# Patient Record
Sex: FEMALE | Race: WHITE | NOT HISPANIC OR LATINO | Employment: FULL TIME | URBAN - METROPOLITAN AREA
[De-identification: names, ages, dates, MRNs, and addresses within clinical notes are randomized per-mention and may not be internally consistent; named-entity substitution may affect disease eponyms.]

---

## 2017-01-16 ENCOUNTER — OFFICE VISIT (OUTPATIENT)
Dept: MEDICAL GROUP | Facility: MEDICAL CENTER | Age: 62
End: 2017-01-16
Attending: FAMILY MEDICINE
Payer: MEDICAID

## 2017-01-16 VITALS
OXYGEN SATURATION: 96 % | TEMPERATURE: 98.2 F | RESPIRATION RATE: 16 BRPM | DIASTOLIC BLOOD PRESSURE: 78 MMHG | SYSTOLIC BLOOD PRESSURE: 126 MMHG | WEIGHT: 140 LBS | HEIGHT: 64 IN | HEART RATE: 76 BPM | BODY MASS INDEX: 23.9 KG/M2

## 2017-01-16 DIAGNOSIS — E78.5 DYSLIPIDEMIA: ICD-10-CM

## 2017-01-16 DIAGNOSIS — K62.5 RECTAL BLEEDING: ICD-10-CM

## 2017-01-16 DIAGNOSIS — M54.12 CERVICAL RADICULOPATHY: ICD-10-CM

## 2017-01-16 DIAGNOSIS — F33.41 RECURRENT MAJOR DEPRESSIVE DISORDER, IN PARTIAL REMISSION (HCC): ICD-10-CM

## 2017-01-16 PROBLEM — F32.4 MAJOR DEPRESSIVE DISORDER WITH SINGLE EPISODE, IN PARTIAL REMISSION (HCC): Status: ACTIVE | Noted: 2017-01-16

## 2017-01-16 PROBLEM — F32.4 MAJOR DEPRESSIVE DISORDER WITH SINGLE EPISODE, IN PARTIAL REMISSION (HCC): Status: RESOLVED | Noted: 2017-01-16 | Resolved: 2017-01-16

## 2017-01-16 PROCEDURE — 99214 OFFICE O/P EST MOD 30 MIN: CPT | Performed by: FAMILY MEDICINE

## 2017-01-16 NOTE — MR AVS SNAPSHOT
"        Honey Conde   2017 10:50 AM   Office Visit   MRN: [id number]    Department:  [facility]   Dept Phone:  [phone]    Description:  Female : 1955   Provider:  Dave Bobby M.D.           Allergies as of 2017     Allergen Noted Reactions    Ceclor [Cefaclor] 2012   Hives    Erythromycin 2012   Swelling    Pcn [Penicillins] 2012         You were diagnosed with     Rectal bleeding   [100402]       Cervical radiculopathy   [573850]       Recurrent major depressive disorder, in partial remission (CMS-[facility])   [6972575]       Dyslipidemia   [059270]         Vital Signs     Blood Pressure Pulse Temperature Respirations Height Weight    126/78 mmHg 76 36.8 °C (98.2 °F) 16 1.626 m (5' 4.02\") 63.504 kg (140 lb)    Body Mass Index Oxygen Saturation Smoking Status             24.02 kg/m2 96% Current Every Day Smoker         Basic Information     Date Of Birth Sex Race Ethnicity Preferred Language    1955 Female White Non- English      Your appointments     2017 11:10 AM   Established Patient with Dave Bobby M.D.   [facility] ([facility])    [address] [place] [address] [id number] [phone]           You will be receiving a confirmation call a few days before your appointment from our automated call confirmation system.              Problem List              ICD-10-CM Priority Class Noted - Resolved    Depression F32.9   7/10/2015 - Present    Anxiety F41.9   7/10/2015 - Present    Cervical radiculopathy M54.12   7/10/2015 - Present    Recurrent major depressive disorder, in partial remission (CMS-HCC) F33.41   10/11/2016 - Present    Dyslipidemia E78.5   2017 - Present    Rectal bleeding K62.5   2017 - Present      Health Maintenance        Date Due Completion Dates    IMM DTaP/Tdap/Td Vaccine (1 - Tdap) 9/10/1976 ---    IMM INFLUENZA (1) 2016 ---    MAMMOGRAM 2017, 4/15/2016, 4/15/2016, 4/15/2016, " 4/15/2016, 12/10/2014 (N/S), 2/10/2009, 2/10/2009    Override on 12/10/2014: (N/S)    PAP SMEAR 2/11/2018 2/11/2015 (N/S)    Override on 2/11/2015: (N/S)    COLONOSCOPY 9/10/2025 9/10/2015 (Declined)    Override on 9/10/2015: Patient Declined (agrees to FIT)            Current Immunizations     No immunizations on file.      Below and/or attached are the medications your provider expects you to take. Review all of your home medications and newly ordered medications with your provider and/or pharmacist. Follow medication instructions as directed by your provider and/or pharmacist. Please keep your medication list with you and share with your provider. Update the information when medications are discontinued, doses are changed, or new medications (including over-the-counter products) are added; and carry medication information at all times in the event of emergency situations     Allergies:  CECLOR - Hives     ERYTHROMYCIN - Swelling     PCN - (reactions not documented)               Medications  Valid as of: January 16, 2017 - 11:28 AM    Generic Name Brand Name Tablet Size Instructions for use    Baclofen (Tab) LIORESAL 20 MG Take 20 mg by mouth 3 times a day.        Baclofen (Tab) LIORESAL 20 MG TAKE ONE TABLET BY MOUTH TWICE DAILY        Hydrocodone-Acetaminophen (Tab) NORCO 5-325 MG Take 1-2 Tabs by mouth every four hours as needed.        hydrocodone/APAP 5/325 mg (NORCO) 5-325 Tab (Tab) NORCO 5-325 Take 1 Tab by mouth every 8 hours as needed.        hydrocodone/APAP 5/325 mg (NORCO) 5-325 Tab (Tab) NORCO 5-325 Take 1 Tab by mouth every 8 hours as needed.        hydrocodone/APAP 5/325 mg (NORCO) 5-325 Tab (Tab) NORCO 5-325 Take 1 Tab by mouth every 8 hours as needed.        PARoxetine HCl (Tab) PAXIL 20 MG Take 20 mg by mouth every day.        PARoxetine HCl (Tab) PAXIL 20 MG Take 1 Tab by mouth every day.        PARoxetine HCl (Tab) PAXIL 20 MG TAKE ONE TABLET BY MOUTH ONCE DAILY        PARoxetine HCl (Tab)  PAXIL 20 MG TAKE ONE TABLET BY MOUTH ONCE DAILY        .                 Medicines prescribed today were sent to:     [facility] - [place], [place] - [address]    [address] [place] [address]    Phone: [phone] Fax: [phone]    Open 24 Hours?: No    [facility] - [place], [place] - [address]    [address] [place] [address]    Phone: [phone] Fax: [phone]    Open 24 Hours?: No      Medication refill instructions:       If your prescription bottle indicates you have medication refills left, it is not necessary to call your provider’s office. Please contact your pharmacy and they will refill your medication.    If your prescription bottle indicates you do not have any refills left, you may request refills at any time through one of the following ways: The online [employer] system (except Urgent Care), by calling your provider’s office, or by asking your pharmacy to contact your provider’s office with a refill request. Medication refills are processed only during regular business hours and may not be available until the next business day. Your provider may request additional information or to have a follow-up visit with you prior to refilling your medication.   *Please Note: Medication refills are assigned a new Rx number when refilled electronically. Your pharmacy may indicate that no refills were authorized even though a new prescription for the same medication is available at the pharmacy. Please request the medicine by name with the pharmacy before contacting your provider for a refill.        Your To Do List     Future Labs/Procedures Complete By Expires    COMP METABOLIC PANEL  As directed 1/17/2018    LIPID PROFILE  As directed 1/17/2018    [facility] PAIN MANAGEMENT SCREEN  As directed 1/16/2018    Comments:    Current Meds (name, sig, last dose):   Current outpatient prescriptions:   •  baclofen, TAKE ONE TABLET BY MOUTH TWICE DAILY  •  paroxetine, TAKE ONE  TABLET BY MOUTH ONCE DAILY  •  hydrocodone/APAP 5/325 mg, 1 Tab, Oral, Q8HRS PRN  •  hydrocodone/APAP 5/325 mg, 1 Tab, Oral, Q8HRS PRN  •  paroxetine, TAKE ONE TABLET BY MOUTH ONCE DAILY  •  hydrocodone/APAP 5/325 mg, 1 Tab, Oral, Q8HRS PRN  •  paroxetine, 20 mg, Oral, DAILY  •  hydrocodone-acetaminophen, 1-2 Tab, Oral, Q4HRS PRN  •  baclofen, 20 mg, Oral, TID  •  paroxetine, 20 mg, Oral, DAILY            Referral     A referral request has been sent to our patient care coordination department. Please allow 3-5 business days for us to process this request and contact you either by phone or mail. If you do not hear from us by the 5th business day, please call us at [phone].        Other Notes About Your Plan     Fall risk done 07/10/2015           eflow Access Code: [email or web address]  Expires: 2/15/2017 11:27 AM    eflow  A secure, online tool to manage your health information     [employer]’s eflow® is a secure, online tool that connects you to your personalized health information from the privacy of your home -- day or night - making it very easy for you to manage your healthcare. Once the activation process is completed, you can even access your medical information using the eflow mike, which is available for free in the Apple Mike store or Google Play store.     eflow provides the following levels of access (as shown below):   My Chart Features   [facility] Doctor [facility]  Specialists [facility]  Urgent  Care Non-[facility]  Primary Care  Doctor   Email your healthcare team securely and privately 24/7 X X X    Manage appointments: schedule your next appointment; view details of past/upcoming appointments X      Request prescription refills. X      View recent personal medical records, including lab and immunizations X X X X   View health record, including health history, allergies, medications X X X X   Read reports about your outpatient visits, procedures, consult and ER notes X X X X   See  your discharge summary, which is a recap of your hospital and/or ER visit that includes your diagnosis, lab results, and care plan. X X       How to register for [employer]:  1. Go to  [email or web address].  2. Click on the Sign Up Now box, which takes you to the New Member Sign Up page. You will need to provide the following information:  a. Enter your CoolClouds Access Code exactly as it appears at the top of this page. (You will not need to use this code after you’ve completed the sign-up process. If you do not sign up before the expiration date, you must request a new code.)   b. Enter your date of birth.   c. Enter your home email address.   d. Click Submit, and follow the next screen’s instructions.  3. Create a CoolClouds ID. This will be your CoolClouds login ID and cannot be changed, so think of one that is secure and easy to remember.  4. Create a CoolClouds password. You can change your password at any time.  5. Enter your Password Reset Question and Answer. This can be used at a later time if you forget your password.   6. Enter your e-mail address. This allows you to receive e-mail notifications when new information is available in CoolClouds.  7. Click Sign Up. You can now view your health information.    For assistance activating your [employer]Cl[employer] account, call [phone]

## 2017-01-16 NOTE — PROGRESS NOTES
No chief complaint on file.      HISTORY OF PRESENT ILLNESS: Patient is a 61 y.o. female established patient who presents today to be evaluated for recent rectal bleeding, cervical radiculopathy, recurrent depression        Rectal bleeding  Patient reports a difficult to describe rectal fullness with slight discomfort intermittently over the past 6-8 weeks. She reports about 10% of the time she will see bright red blood either on the tissue paper or just on the outside of the stool. She has no family history of colon cancer. She reports she had a unremarkable colonoscopy done as part of evaluation of abdominal pain approximately 1996. She had been reporting some flat ribbon-type stools although reports a normal caliber stool in the last 48 hours. When not bloody stool has been predominantly soft and brown. She has had no unexplained weight loss. Denies any dysuria, and no urinary or fecal incontinence.    Cervical radiculopathy  Patient continues to experience daily mid to lower neck pain. She reports occasional numbness involving either hand weekly when she is in in bed or has just been in bed. She is getting fairly good relief utilizing on average 2.5 tablets of Norco 5/325 per day. She ran out about 4 days ago due to having to postpone her follow-up appointment due to weather issues. Not noticing any upper extremity weakness other than tenderness limiting motion in her right shoulder for the past month associated with increased    Recurrent depression  Patient's compliance taking Paxil 20 mg daily. Denies suicidal ideation, confusion, near syncope    Patient Active Problem List    Diagnosis Date Noted   • Dyslipidemia 01/16/2017   • Rectal bleeding 01/16/2017   • Recurrent major depressive disorder, in partial remission (CMS-[facility]) 10/11/2016   • Depression 07/10/2015   • Anxiety 07/10/2015   • Cervical radiculopathy 07/10/2015       Allergies:Ceclor; Erythromycin; and Pcn    Current Outpatient Prescriptions    Medication Sig Dispense Refill   • hydrocodone/APAP 5/325 mg (NORCO) 5-325 Tab Take 1 Tab by mouth every 8 hours as needed. 75 Tab 0   • baclofen (LIORESAL) 20 MG tablet TAKE ONE TABLET BY MOUTH TWICE DAILY 60 Tab 6   • paroxetine (PAXIL) 20 MG Tab TAKE ONE TABLET BY MOUTH ONCE DAILY 30 Tab 6   • hydrocodone/APAP 5/325 mg (NORCO) 5-325 Tab Take 1 Tab by mouth every 8 hours as needed. 90 Tab 0   • paroxetine (PAXIL) 20 MG Tab TAKE ONE TABLET BY MOUTH ONCE DAILY 30 Tab 3   • hydrocodone/APAP 5/325 mg (NORCO) 5-325 Tab Take 1 Tab by mouth every 8 hours as needed. 90 Tab 0   • paroxetine (PAXIL) 20 MG TABS Take 20 mg by mouth every day.     • hydrocodone-acetaminophen (NORCO) 5-325 MG TABS per tablet Take 1-2 Tabs by mouth every four hours as needed.     • baclofen (LIORESAL) 20 MG tablet Take 20 mg by mouth 3 times a day.     • paroxetine (PAXIL) 20 MG TABS Take 1 Tab by mouth every day. 30 Tab 6     No current facility-administered medications for this visit.       Social History   Substance Use Topics   • Smoking status: Current Every Day Smoker -- 0.50 packs/day   • Smokeless tobacco: Never Used   • Alcohol Use: 0.0 oz/week     0 Standard drinks or equivalent per week      Comment: intermittently       Family History   Problem Relation Age of Onset   • Cancer Mother 76     breast   • Heart Disease Maternal Uncle    • Diabetes Maternal Grandmother    • Stroke Neg Hx        ROS:  Review of Systems   Constitutional: Negative for fever, chills, weight loss and malaise/fatigue.   Eyes: Negative for blurred vision.   Respiratory: Negative for cough, sputum production, shortness of breath and wheezing.    Cardiovascular: Negative for chest pain, palpitations, orthopnea and leg swelling.   Gastrointestinal: Negative for heartburn, nausea, vomiting and abdominal pain.   Endo/Heme/Allergies: Does not bruise/bleed easily.               Exam:  Blood pressure 126/78, pulse 76, temperature 36.8 °C (98.2 °F), resp. rate 16,  "height 1.626 m (5' 4.02\"), weight 63.504 kg (140 lb), SpO2 96 %.  General:  Well nourished, well developed female in NAD  Head is grossly normal.  Neck: Supple without JVD or bruit. Thyroid is not enlarged. Trachea is midline.  Pulmonary: Clear to ausculation .  Normal effort. No rales, ronchi, or wheezing.  Cardiovascular: Regular rate and rhythm without murmur.  Abdomen-Abdomen is soft, normal bowel sounds, no masses, guarding, ororganomegaly, or tenderness.  Lower extremities- neg for pretibial edema, redness, tenderness.  Rectal-3 medium-sized soft hemorrhoidal tags are noted at the 3:00 position. Digital rectal exam reveals no rectal masses or palpable internal hemorrhoids. No blood visible on exam glove.   Upper extremities-intact light touch, intact strength bilaterally    Please note that this dictation was created using voice recognition software. I have made every reasonable attempt to correct obvious errors, but I expect that there are errors of grammar and possibly content that I did not discover before finalizing the note.    Assessment/Plan:  1. Rectal bleeding  REFERRAL TO GASTROENTEROLOGY   2. Cervical radiculopathy  [facility] PAIN MANAGEMENT SCREEN    hydrocodone/APAP 5/325 mg (NORCO) 5-325 Tab   3. Recurrent major depressive disorder, in partial remission (CMS-HCC)     4. Dyslipidemia  LIPID PROFILE    COMP METABOLIC PANEL      Plan: 1. GI referral for diagnostic colonoscopy  2. Reorder labs for lipids, CMP  3. [facility] UDS today  4. Renew Norco 5/325, #75 today    "

## 2017-01-16 NOTE — ASSESSMENT & PLAN NOTE
Patient reports a difficult to describe rectal fullness with slight discomfort intermittently over the past 6-8 weeks. She reports about 10% of the time she will see bright red blood either on the tissue paper or just on the outside of the stool. She has no family history of colon cancer. She reports she had a unremarkable colonoscopy done as part of evaluation of abdominal pain approximately 1996. She had been reporting some flat ribbon-type stools although reports a normal caliber stool in the last 48 hours. When not bloody stool has been predominantly soft and brown. She has had no unexplained weight loss. Denies any dysuria, and no urinary or fecal incontinence.

## 2017-01-16 NOTE — ASSESSMENT & PLAN NOTE
Patient reports mood is stable with no suicidal ideation or tearfulness. She is compliant taking paroxetine 20 mg daily at bedtime

## 2017-01-16 NOTE — ASSESSMENT & PLAN NOTE
Patient continues to experience daily mid to lower neck pain. She reports occasional numbness involving either hand weekly when she is in in bed or has just been in bed. She is getting fairly good relief utilizing on average 2.5 tablets of Norco 5/325 per day. She ran out about 4 days ago due to having to postpone her follow-up appointment due to weather issues. Not noticing any upper extremity weakness other than tenderness limiting motion in her right shoulder for the past month associated with increased

## 2017-02-13 ENCOUNTER — HOSPITAL ENCOUNTER (OUTPATIENT)
Dept: LAB | Facility: MEDICAL CENTER | Age: 62
End: 2017-02-13
Attending: FAMILY MEDICINE
Payer: MEDICAID

## 2017-02-13 DIAGNOSIS — E78.5 DYSLIPIDEMIA: ICD-10-CM

## 2017-02-13 LAB
ALBUMIN SERPL BCP-MCNC: 4.2 G/DL (ref 3.2–4.9)
ALBUMIN/GLOB SERPL: 1.8 G/DL
ALP SERPL-CCNC: 53 U/L (ref 30–99)
ALT SERPL-CCNC: <5 U/L (ref 2–50)
ANION GAP SERPL CALC-SCNC: 6 MMOL/L (ref 0–11.9)
AST SERPL-CCNC: 14 U/L (ref 12–45)
BILIRUB SERPL-MCNC: 0.4 MG/DL (ref 0.1–1.5)
BUN SERPL-MCNC: 20 MG/DL (ref 8–22)
CALCIUM SERPL-MCNC: 9.8 MG/DL (ref 8.5–10.5)
CHLORIDE SERPL-SCNC: 109 MMOL/L (ref 96–112)
CHOLEST SERPL-MCNC: 211 MG/DL (ref 100–199)
CO2 SERPL-SCNC: 27 MMOL/L (ref 20–33)
CREAT SERPL-MCNC: 0.75 MG/DL (ref 0.5–1.4)
GLOBULIN SER CALC-MCNC: 2.3 G/DL (ref 1.9–3.5)
GLUCOSE SERPL-MCNC: 106 MG/DL (ref 65–99)
HDLC SERPL-MCNC: 55 MG/DL
LDLC SERPL CALC-MCNC: 140 MG/DL
POTASSIUM SERPL-SCNC: 4 MMOL/L (ref 3.6–5.5)
PROT SERPL-MCNC: 6.5 G/DL (ref 6–8.2)
SODIUM SERPL-SCNC: 142 MMOL/L (ref 135–145)
TRIGL SERPL-MCNC: 80 MG/DL (ref 0–149)

## 2017-02-13 PROCEDURE — 80053 COMPREHEN METABOLIC PANEL: CPT

## 2017-02-13 PROCEDURE — 80061 LIPID PANEL: CPT

## 2017-02-13 PROCEDURE — 36415 COLL VENOUS BLD VENIPUNCTURE: CPT

## 2017-02-14 ENCOUNTER — TELEPHONE (OUTPATIENT)
Dept: MEDICAL GROUP | Facility: MEDICAL CENTER | Age: 62
End: 2017-02-14

## 2017-02-16 ENCOUNTER — OFFICE VISIT (OUTPATIENT)
Dept: MEDICAL GROUP | Facility: MEDICAL CENTER | Age: 62
End: 2017-02-16
Attending: FAMILY MEDICINE
Payer: MEDICAID

## 2017-02-16 VITALS
RESPIRATION RATE: 12 BRPM | TEMPERATURE: 97.3 F | DIASTOLIC BLOOD PRESSURE: 62 MMHG | WEIGHT: 140 LBS | BODY MASS INDEX: 23.9 KG/M2 | SYSTOLIC BLOOD PRESSURE: 110 MMHG | HEART RATE: 60 BPM | HEIGHT: 64 IN | OXYGEN SATURATION: 95 %

## 2017-02-16 DIAGNOSIS — E78.5 DYSLIPIDEMIA: ICD-10-CM

## 2017-02-16 DIAGNOSIS — M54.12 CERVICAL RADICULOPATHY: ICD-10-CM

## 2017-02-16 PROCEDURE — 99213 OFFICE O/P EST LOW 20 MIN: CPT | Performed by: FAMILY MEDICINE

## 2017-02-16 PROCEDURE — 99212 OFFICE O/P EST SF 10 MIN: CPT | Performed by: FAMILY MEDICINE

## 2017-02-16 ASSESSMENT — PATIENT HEALTH QUESTIONNAIRE - PHQ9: CLINICAL INTERPRETATION OF PHQ2 SCORE: 0

## 2017-02-16 ASSESSMENT — PAIN SCALES - GENERAL: PAINLEVEL: 8=MODERATE-SEVERE PAIN

## 2017-02-16 NOTE — PROGRESS NOTES
"Subjective:      Honey Conde is a 61 y.o. female who presents with Follow-Up            HPI 1. Cervical radiculopathy-patient reports daily mild to moderate pain in the posterior aspect of her neck with rare brief radiation into either arm-that typically has to do with sleeping with her arms raised in a poor position above her head while in bed. She reports no upper extremity numbness. She gets by comfortably on most days with her current reduction and prescription of Norco 5/325 #75 for the month. UDS collected last month was appropriate   2. Dyslipidemia-recent labs notable for LDL of 140 with total cholesterol of 214, excellent triglycerides and modest elevation of HDL cholesterol. Patient reports current minimal red meat intake modest cheese exposure and she avoids eggs altogether.  ROS negative for current chest pain, chest pressure, focal weakness, difficulty swallowing       Objective:     /62 mmHg  Pulse 60  Temp(Src) 36.3 °C (97.3 °F)  Resp 12  Ht 1.626 m (5' 4.02\")  Wt 63.504 kg (140 lb)  BMI 24.02 kg/m2  SpO2 95%  Breastfeeding? No     Physical Exam  Gen.- alert, cooperative, in no acute distress  Neck- midline trachea, thyroid not enlarged or tender,supple, no cervical adenopathy  Chest-clear to auscultation and percussion with normal breath sounds. No retractions. Chest wall nontender  Cardiac- regular rhythm and rate. No murmur, thrill, or heave  Abdomen- normal bowel sounds, soft without guarding. Liver and spleen not enlarged, no palpable masses or tenderness.          Assessment/Plan:     1. Cervical radiculopathy    - hydrocodone/APAP 5/325 mg (NORCO) 5-325 Tab; Take 1 Tab by mouth every 8 hours as needed.  Dispense: 75 Tab; Refill: 0    2. Dyslipidemia  Plan: 1. Will renew Norco 5/325, #75 per month each month for 3 months  2. Revisit in 3 months with consideration of further reducing Norco quantity  3. Discussed dietary adjustment to reduce LDL cholesterol  4. Colonoscopy is " pending-intake evaluation mid-March

## 2017-02-16 NOTE — MR AVS SNAPSHOT
"        Honey Conde   2017 11:10 AM   Office Visit   MRN: [id number]    [facility]   Dept Phone:  [phone]    Description:  Female : 1955   Provider:  Dave Bobby M.D.           Reason for Visit     Follow-Up           Allergies as of 2017     Allergen Noted Reactions    Ceclor [Cefaclor] 2012   Hives    Erythromycin 2012   Swelling    Pcn [Penicillins] 2012         You were diagnosed with     Cervical radiculopathy   [954208]       Dyslipidemia   [386393]         Vital Signs     Blood Pressure Pulse Temperature Respirations Height Weight    110/62 mmHg 60 36.3 °C (97.3 °F) 12 1.626 m (5' 4.02\") 63.504 kg (140 lb)    Body Mass Index Oxygen Saturation Breastfeeding? Smoking Status          24.02 kg/m2 95% No Current Every Day Smoker        Basic Information     Date Of Birth Sex Race Ethnicity Preferred Language    1955 Female White Non- English      Problem List              ICD-10-CM Priority Class Noted - Resolved    Depression F32.9   7/10/2015 - Present    Anxiety F41.9   7/10/2015 - Present    Cervical radiculopathy M54.12   7/10/2015 - Present    Recurrent major depressive disorder, in partial remission (CMS-HCC) F33.41   10/11/2016 - Present    Dyslipidemia E78.5   2017 - Present    Rectal bleeding K62.5   2017 - Present      Health Maintenance        Date Due Completion Dates    IMM DTaP/Tdap/Td Vaccine (1 - Tdap) 9/10/1976 ---    IMM INFLUENZA (1) 2016 ---    MAMMOGRAM 2017, 4/15/2016, 4/15/2016, 4/15/2016, 4/15/2016, 12/10/2014 (N/S), 2/10/2009    Override on 12/10/2014: (N/S)    PAP SMEAR 2018 (N/S)    Override on 2015: (N/S)    COLONOSCOPY 9/10/2025 9/10/2015 (Declined)    Override on 9/10/2015: Patient Declined (agrees to FIT)            Current Immunizations     No immunizations on file.      Below and/or attached are the medications your provider expects you to take. Review " all of your home medications and newly ordered medications with your provider and/or pharmacist. Follow medication instructions as directed by your provider and/or pharmacist. Please keep your medication list with you and share with your provider. Update the information when medications are discontinued, doses are changed, or new medications (including over-the-counter products) are added; and carry medication information at all times in the event of emergency situations     Allergies:  CECLOR - Hives     ERYTHROMYCIN - Swelling     PCN - (reactions not documented)               Medications  Valid as of: February 16, 2017 - 12:53 PM    Generic Name Brand Name Tablet Size Instructions for use    Baclofen (Tab) LIORESAL 20 MG Take 20 mg by mouth 3 times a day.        Baclofen (Tab) LIORESAL 20 MG TAKE ONE TABLET BY MOUTH TWICE DAILY        Hydrocodone-Acetaminophen (Tab) NORCO 5-325 MG Take 1-2 Tabs by mouth every four hours as needed.        hydrocodone/APAP 5/325 mg (NORCO) 5-325 Tab (Tab) NORCO 5-325 Take 1 Tab by mouth every 8 hours as needed.        hydrocodone/APAP 5/325 mg (NORCO) 5-325 Tab (Tab) NORCO 5-325 Take 1 Tab by mouth every 8 hours as needed.        hydrocodone/APAP 5/325 mg (NORCO) 5-325 Tab (Tab) NORCO 5-325 Take 1 Tab by mouth every 8 hours as needed.        PARoxetine HCl (Tab) PAXIL 20 MG Take 20 mg by mouth every day.        PARoxetine HCl (Tab) PAXIL 20 MG Take 1 Tab by mouth every day.        PARoxetine HCl (Tab) PAXIL 20 MG TAKE ONE TABLET BY MOUTH ONCE DAILY        PARoxetine HCl (Tab) PAXIL 20 MG TAKE ONE TABLET BY MOUTH ONCE DAILY        .                 Medicines prescribed today were sent to:     [facility] [address] [address]    Phone: [phone] Fax: [phone]    Open 24 Hours?: No    [facility] [address] [place] [address] - [address] [address]    Phone: [phone] Fax: [phone]       Open 24 Hours?: No      Medication refill instructions:       If your prescription bottle indicates you have medication refills left, it is not necessary to call your provider’s office. Please contact your pharmacy and they will refill your medication.    If your prescription bottle indicates you do not have any refills left, you may request refills at any time through one of the following ways: The online Pay-Me system (except Urgent Care), by calling your provider’s office, or by asking your pharmacy to contact your provider’s office with a refill request. Medication refills are processed only during regular business hours and may not be available until the next business day. Your provider may request additional information or to have a follow-up visit with you prior to refilling your medication.   *Please Note: Medication refills are assigned a new Rx number when refilled electronically. Your pharmacy may indicate that no refills were authorized even though a new prescription for the same medication is available at the pharmacy. Please request the medicine by name with the pharmacy before contacting your provider for a refill.        Other Notes About Your Plan     Fall risk done 07/10/2015           Pay-Me Access Code: [email or web address]  Expires: 3/18/2017 12:53 PM    Pay-Me  A secure, online tool to manage your health information     [employer]’s Pay-Me® is a secure, online tool that connects you to your personalized health information from the privacy of your home -- day or night - making it very easy for you to manage your healthcare. Once the activation process is completed, you can even access your medical information using the Pay-Me mike, which is available for free in the Apple Mike store or Google Play store.     Pay-Me provides the following levels of access (as shown below):   My Chart Features   [facility] Doctor [facility]  Specialists [facility]  Urgent  Care Non-[facility]  Primary  Care  Doctor   Email your healthcare team securely and privately 24/7 X X X    Manage appointments: schedule your next appointment; view details of past/upcoming appointments X      Request prescription refills. X      View recent personal medical records, including lab and immunizations X X X X   View health record, including health history, allergies, medications X X X X   Read reports about your outpatient visits, procedures, consult and ER notes X X X X   See your discharge summary, which is a recap of your hospital and/or ER visit that includes your diagnosis, lab results, and care plan. X X       How to register for Nibu:  1. Go to  [email or web address].  2. Click on the Sign Up Now box, which takes you to the New Member Sign Up page. You will need to provide the following information:  a. Enter your Nibu Access Code exactly as it appears at the top of this page. (You will not need to use this code after you’ve completed the sign-up process. If you do not sign up before the expiration date, you must request a new code.)   b. Enter your date of birth.   c. Enter your home email address.   d. Click Submit, and follow the next screen’s instructions.  3. Create a Nibu ID. This will be your Nibu login ID and cannot be changed, so think of one that is secure and easy to remember.  4. Create a Nibu password. You can change your password at any time.  5. Enter your Password Reset Question and Answer. This can be used at a later time if you forget your password.   6. Enter your e-mail address. This allows you to receive e-mail notifications when new information is available in Nibu.  7. Click Sign Up. You can now view your health information.    For assistance activating your Nibu account, call [phone]

## 2017-03-06 ENCOUNTER — OFFICE VISIT (OUTPATIENT)
Dept: MEDICAL GROUP | Facility: MEDICAL CENTER | Age: 62
End: 2017-03-06
Attending: FAMILY MEDICINE
Payer: MEDICAID

## 2017-03-06 VITALS
HEART RATE: 72 BPM | WEIGHT: 141.3 LBS | DIASTOLIC BLOOD PRESSURE: 76 MMHG | OXYGEN SATURATION: 98 % | SYSTOLIC BLOOD PRESSURE: 122 MMHG | TEMPERATURE: 97.9 F | RESPIRATION RATE: 16 BRPM | BODY MASS INDEX: 24.12 KG/M2 | HEIGHT: 64 IN

## 2017-03-06 DIAGNOSIS — J02.9 ACUTE PHARYNGITIS, UNSPECIFIED ETIOLOGY: ICD-10-CM

## 2017-03-06 PROCEDURE — 99213 OFFICE O/P EST LOW 20 MIN: CPT | Performed by: FAMILY MEDICINE

## 2017-03-06 PROCEDURE — 99212 OFFICE O/P EST SF 10 MIN: CPT | Performed by: FAMILY MEDICINE

## 2017-03-06 RX ORDER — SULFAMETHOXAZOLE AND TRIMETHOPRIM 800; 160 MG/1; MG/1
1 TABLET ORAL 2 TIMES DAILY
Qty: 14 TAB | Refills: 0 | Status: SHIPPED | OUTPATIENT
Start: 2017-03-06 | End: 2017-06-13

## 2017-03-06 NOTE — MR AVS SNAPSHOT
"        Honey GOLDSTEIN Debbiejosef   3/6/2017 4:10 PM   Office Visit   MRN: [id number]    [facility]   Dept Phone:  [phone]    Description:  Female : 1955   Provider:  Dave Bobby M.D.           Reason for Visit     Follow-Up           Allergies as of 3/6/2017     Allergen Noted Reactions    Ceclor [Cefaclor] 2012   Hives    Erythromycin 2012   Swelling    Pcn [Penicillins] 2012         You were diagnosed with     Acute pharyngitis, unspecified etiology   [3939100]         Vital Signs     Blood Pressure Pulse Temperature Respirations Height Weight    122/76 mmHg 72 36.6 °C (97.9 °F) 16 1.626 m (5' 4.02\") 64.093 kg (141 lb 4.8 oz)    Body Mass Index Oxygen Saturation Smoking Status             24.24 kg/m2 98% Current Every Day Smoker         Basic Information     Date Of Birth Sex Race Ethnicity Preferred Language    1955 Female White Non- English      Problem List              ICD-10-CM Priority Class Noted - Resolved    Depression F32.9   7/10/2015 - Present    Anxiety F41.9   7/10/2015 - Present    Cervical radiculopathy M54.12   7/10/2015 - Present    Recurrent major depressive disorder, in partial remission (CMS-HCC) F33.41   10/11/2016 - Present    Dyslipidemia E78.5   2017 - Present    Rectal bleeding K62.5   2017 - Present      Health Maintenance        Date Due Completion Dates    IMM DTaP/Tdap/Td Vaccine (1 - Tdap) 9/10/1976 ---    IMM INFLUENZA (1) 2016 ---    MAMMOGRAM 2017, 4/15/2016, 4/15/2016, 4/15/2016, 4/15/2016, 12/10/2014 (N/S), 2/10/2009    Override on 12/10/2014: (N/S)    PAP SMEAR 2018 (N/S)    Override on 2015: (N/S)    COLONOSCOPY 9/10/2025 9/10/2015 (Declined)    Override on 9/10/2015: Patient Declined (agrees to FIT)            Current Immunizations     No immunizations on file.      Below and/or attached are the medications your provider expects you to take. Review all of your home " medications and newly ordered medications with your provider and/or pharmacist. Follow medication instructions as directed by your provider and/or pharmacist. Please keep your medication list with you and share with your provider. Update the information when medications are discontinued, doses are changed, or new medications (including over-the-counter products) are added; and carry medication information at all times in the event of emergency situations     Allergies:  CECLOR - Hives     ERYTHROMYCIN - Swelling     PCN - (reactions not documented)               Medications  Valid as of: March 06, 2017 -  5:06 PM    Generic Name Brand Name Tablet Size Instructions for use    Baclofen (Tab) LIORESAL 20 MG Take 20 mg by mouth 3 times a day.        Baclofen (Tab) LIORESAL 20 MG TAKE ONE TABLET BY MOUTH TWICE DAILY        Hydrocodone-Acetaminophen (Tab) NORCO 5-325 MG Take 1-2 Tabs by mouth every four hours as needed.        hydrocodone/APAP 5/325 mg (NORCO) 5-325 Tab (Tab) NORCO 5-325 Take 1 Tab by mouth every 8 hours as needed.        hydrocodone/APAP 5/325 mg (NORCO) 5-325 Tab (Tab) NORCO 5-325 Take 1 Tab by mouth every 8 hours as needed.        hydrocodone/APAP 5/325 mg (NORCO) 5-325 Tab (Tab) NORCO 5-325 Take 1 Tab by mouth every 8 hours as needed.        PARoxetine HCl (Tab) PAXIL 20 MG Take 20 mg by mouth every day.        PARoxetine HCl (Tab) PAXIL 20 MG Take 1 Tab by mouth every day.        PARoxetine HCl (Tab) PAXIL 20 MG TAKE ONE TABLET BY MOUTH ONCE DAILY        PARoxetine HCl (Tab) PAXIL 20 MG TAKE ONE TABLET BY MOUTH ONCE DAILY        Sulfamethoxazole-Trimethoprim (Tab) BACTRIM -160 MG Take 1 Tab by mouth 2 times a day.        .                 Medicines prescribed today were sent to:     [facility] - [place], [place] - [address] [facility].    [address] [place] [address]    Phone: [phone] Fax: [phone]    Open 24 Hours?: No    [facility] [place], [place] - [address] [facility]     [address] [facility] [address]    Phone: [phone] Fax: [phone]    Open 24 Hours?: No      Medication refill instructions:       If your prescription bottle indicates you have medication refills left, it is not necessary to call your provider’s office. Please contact your pharmacy and they will refill your medication.    If your prescription bottle indicates you do not have any refills left, you may request refills at any time through one of the following ways: The online Cloudacc system (except Urgent Care), by calling your provider’s office, or by asking your pharmacy to contact your provider’s office with a refill request. Medication refills are processed only during regular business hours and may not be available until the next business day. Your provider may request additional information or to have a follow-up visit with you prior to refilling your medication.   *Please Note: Medication refills are assigned a new Rx number when refilled electronically. Your pharmacy may indicate that no refills were authorized even though a new prescription for the same medication is available at the pharmacy. Please request the medicine by name with the pharmacy before contacting your provider for a refill.        Other Notes About Your Plan     Fall risk done 07/10/2015           Cloudacc Access Code: [email or web address]  Expires: 3/18/2017 12:53 PM    Cloudacc  A secure, online tool to manage your health information     [employer]’s Cloudacc® is a secure, online tool that connects you to your personalized health information from the privacy of your home -- day or night - making it very easy for you to manage your healthcare. Once the activation process is completed, you can even access your medical information using the Cloudacc mike, which is available for free in the Apple Mike store or Google Play store.     Cloudacc provides the following levels of access (as shown below):   My Chart Features   [facility]  Care Doctor [facility]  Specialists [facility]  Urgent  Care Non-[facility]  Primary Care  Doctor   Email your healthcare team securely and privately 24/7 X X X    Manage appointments: schedule your next appointment; view details of past/upcoming appointments X      Request prescription refills. X      View recent personal medical records, including lab and immunizations X X X X   View health record, including health history, allergies, medications X X X X   Read reports about your outpatient visits, procedures, consult and ER notes X X X X   See your discharge summary, which is a recap of your hospital and/or ER visit that includes your diagnosis, lab results, and care plan. X X       How to register for [facility]:  1. Go to  [email or web address].  2. Click on the Sign Up Now box, which takes you to the New Member Sign Up page. You will need to provide the following information:  a. Enter your [facility] Access Code exactly as it appears at the top of this page. (You will not need to use this code after you’ve completed the sign-up process. If you do not sign up before the expiration date, you must request a new code.)   b. Enter your date of birth.   c. Enter your home email address.   d. Click Submit, and follow the next screen’s instructions.  3. Create a [facility] ID. This will be your [facility] login ID and cannot be changed, so think of one that is secure and easy to remember.  4. Create a [facility] password. You can change your password at any time.  5. Enter your Password Reset Question and Answer. This can be used at a later time if you forget your password.   6. Enter your e-mail address. This allows you to receive e-mail notifications when new information is available in [facility].  7. Click Sign Up. You can now view your health information.    For assistance activating your [facility] account, call [phone]

## 2017-03-07 NOTE — PROGRESS NOTES
Subjective:      Honey Conde is a 61 y.o. female who presents with No chief complaint on file.            HPI 1. Acute pharyngitis-patient is now living with her son and his family (2 adults, 3 children), all of whom have been diagnosed with testing as having strep sore throats in the past 10 days and have been treated. Patient reports that she developed onset 2 days ago of sore throat with very slight diarrhea    ROS positive for slight cough noted mostly at night without sputum production. Negative for rash. Positive for some pain in the right ear.     Objective:     Vitals: Temp 97.9 Fahrenheit, oxygenation 98%, pulse 72, respirations 16, weight 153 pounds    Physical Exam  General- alert,cooperative patient in no acute distress  Ears- normal tms without redness, perforation. Canals unremarkable  Nares- clear, pink, moist mucosa without bleeding. No purulent nasal DC  Orophx.- lips normal. Clear, pink, moist mucosa without redness or exudate. Tongue is midline  Chest-Normal to auscultation and percussion, movement is symmetric. Nontender to palpation.   Sinuses-nontender to compression of the frontal and maxillary areas            Assessment/Plan:     1. Acute pharyngitis, unspecified etiology      Plan: 1. Given the situation at home will cover patient with Septra DS ×7 days

## 2017-04-12 ENCOUNTER — HOSPITAL ENCOUNTER (OUTPATIENT)
Dept: LAB | Facility: MEDICAL CENTER | Age: 62
End: 2017-04-12
Attending: NURSE PRACTITIONER
Payer: MEDICAID

## 2017-04-12 LAB
BASOPHILS # BLD AUTO: 1.3 % (ref 0–1.8)
BASOPHILS # BLD: 0.06 K/UL (ref 0–0.12)
EOSINOPHIL # BLD AUTO: 0.11 K/UL (ref 0–0.51)
EOSINOPHIL NFR BLD: 2.3 % (ref 0–6.9)
ERYTHROCYTE [DISTWIDTH] IN BLOOD BY AUTOMATED COUNT: 43.9 FL (ref 35.9–50)
HCT VFR BLD AUTO: 38.7 % (ref 37–47)
HGB BLD-MCNC: 12.6 G/DL (ref 12–16)
IMM GRANULOCYTES # BLD AUTO: 0.01 K/UL (ref 0–0.11)
IMM GRANULOCYTES NFR BLD AUTO: 0.2 % (ref 0–0.9)
LYMPHOCYTES # BLD AUTO: 1.85 K/UL (ref 1–4.8)
LYMPHOCYTES NFR BLD: 39.2 % (ref 22–41)
MCH RBC QN AUTO: 30.5 PG (ref 27–33)
MCHC RBC AUTO-ENTMCNC: 32.6 G/DL (ref 33.6–35)
MCV RBC AUTO: 93.7 FL (ref 81.4–97.8)
MONOCYTES # BLD AUTO: 0.32 K/UL (ref 0–0.85)
MONOCYTES NFR BLD AUTO: 6.8 % (ref 0–13.4)
NEUTROPHILS # BLD AUTO: 2.37 K/UL (ref 2–7.15)
NEUTROPHILS NFR BLD: 50.2 % (ref 44–72)
NRBC # BLD AUTO: 0 K/UL
NRBC BLD AUTO-RTO: 0 /100 WBC
PLATELET # BLD AUTO: 262 K/UL (ref 164–446)
PMV BLD AUTO: 10.3 FL (ref 9–12.9)
RBC # BLD AUTO: 4.13 M/UL (ref 4.2–5.4)
WBC # BLD AUTO: 4.7 K/UL (ref 4.8–10.8)

## 2017-04-12 PROCEDURE — 36415 COLL VENOUS BLD VENIPUNCTURE: CPT

## 2017-04-12 PROCEDURE — 85025 COMPLETE CBC W/AUTO DIFF WBC: CPT

## 2017-05-15 ENCOUNTER — OFFICE VISIT (OUTPATIENT)
Dept: MEDICAL GROUP | Facility: MEDICAL CENTER | Age: 62
End: 2017-05-15
Attending: FAMILY MEDICINE
Payer: MEDICAID

## 2017-05-15 VITALS
DIASTOLIC BLOOD PRESSURE: 58 MMHG | TEMPERATURE: 98.2 F | HEART RATE: 72 BPM | RESPIRATION RATE: 16 BRPM | HEIGHT: 64 IN | SYSTOLIC BLOOD PRESSURE: 110 MMHG | WEIGHT: 145 LBS | BODY MASS INDEX: 24.75 KG/M2

## 2017-05-15 DIAGNOSIS — Z12.31 ENCOUNTER FOR SCREENING MAMMOGRAM FOR MALIGNANT NEOPLASM OF BREAST: ICD-10-CM

## 2017-05-15 DIAGNOSIS — M54.6 CHRONIC BILATERAL THORACIC BACK PAIN: ICD-10-CM

## 2017-05-15 DIAGNOSIS — M54.12 CERVICAL RADICULOPATHY: ICD-10-CM

## 2017-05-15 DIAGNOSIS — G89.29 CHRONIC BILATERAL THORACIC BACK PAIN: ICD-10-CM

## 2017-05-15 PROCEDURE — 99213 OFFICE O/P EST LOW 20 MIN: CPT | Performed by: FAMILY MEDICINE

## 2017-05-15 ASSESSMENT — PAIN SCALES - GENERAL: PAINLEVEL: 9=SEVERE PAIN
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
## 2017-05-15 NOTE — MR AVS SNAPSHOT
"        Honey Conde   5/15/2017 3:10 PM   Office Visit   MRN: [id number]    Department:  [facility]   Dept Phone:  [phone]    Description:  Female : 1955   Provider:  Dave Bobby M.D.           Reason for Visit     Anxiety           Allergies as of 5/15/2017     Allergen Noted Reactions    Ceclor [Cefaclor] 2012   Hives    Erythromycin 2012   Swelling    Pcn [Penicillins] 2012         You were diagnosed with     Encounter for screening mammogram for malignant neoplasm of breast   [588419]       Chronic bilateral thoracic back pain   [8749749]       Cervical radiculopathy   [876159]         Vital Signs     Blood Pressure Pulse Temperature Respirations Height Weight    110/58 mmHg 72 36.8 °C (98.2 °F) 16 1.626 m (5' 4.02\") 65.772 kg (145 lb)    Body Mass Index Last Menstrual Period Breastfeeding? Smoking Status          24.88 kg/m2 03/10/2004 No Current Every Day Smoker        Basic Information     Date Of Birth Sex Race Ethnicity Preferred Language    1955 Female White Non- English      Your appointments     2017 10:50 AM   Established Patient with Dave Bobby M.D.   [facility] ([facility])    [address] [place] [id number] [phone]           You will be receiving a confirmation call a few days before your appointment from our automated call confirmation system.              Problem List              ICD-10-CM Priority Class Noted - Resolved    Depression F32.9   7/10/2015 - Present    Anxiety F41.9   7/10/2015 - Present    Cervical radiculopathy M54.12   7/10/2015 - Present    Recurrent major depressive disorder, in partial remission (CMS-HCC) F33.41   10/11/2016 - Present    Dyslipidemia E78.5   2017 - Present    Rectal bleeding K62.5   2017 - Present      Health Maintenance        Date Due Completion Dates    IMM DTaP/Tdap/Td Vaccine (1 - Tdap) 9/10/1976 ---    MAMMOGRAM 2017, 12/10/2014 " (N/S), 2/10/2009    Override on 12/10/2014: (N/S)    PAP SMEAR 2/11/2018 2/11/2015 (N/S)    Override on 2/11/2015: (N/S)    COLONOSCOPY 9/10/2025 9/10/2015 (Declined)    Override on 9/10/2015: Patient Declined (agrees to FIT)            Current Immunizations     No immunizations on file.      Below and/or attached are the medications your provider expects you to take. Review all of your home medications and newly ordered medications with your provider and/or pharmacist. Follow medication instructions as directed by your provider and/or pharmacist. Please keep your medication list with you and share with your provider. Update the information when medications are discontinued, doses are changed, or new medications (including over-the-counter products) are added; and carry medication information at all times in the event of emergency situations     Allergies:  CECLOR - Hives     ERYTHROMYCIN - Swelling     PCN - (reactions not documented)               Medications  Valid as of: May 15, 2017 -  3:52 PM    Generic Name Brand Name Tablet Size Instructions for use    Baclofen (Tab) LIORESAL 20 MG Take 20 mg by mouth 3 times a day.        Baclofen (Tab) LIORESAL 20 MG TAKE ONE TABLET BY MOUTH TWICE DAILY        Hydrocodone-Acetaminophen (Tab) NORCO 5-325 MG Take 1-2 Tabs by mouth every four hours as needed.        hydrocodone/APAP 5/325 mg (NORCO) 5-325 Tab (Tab) NORCO 5-325 Take 1 Tab by mouth every 8 hours as needed.        hydrocodone/APAP 5/325 mg (NORCO) 5-325 Tab (Tab) NORCO 5-325 Take 1 Tab by mouth every 8 hours as needed.        hydrocodone/APAP 5/325 mg (NORCO) 5-325 Tab (Tab) NORCO 5-325 Take 1 Tab by mouth every 8 hours as needed.        PARoxetine HCl (Tab) PAXIL 20 MG Take 20 mg by mouth every day.        PARoxetine HCl (Tab) PAXIL 20 MG Take 1 Tab by mouth every day.        PARoxetine HCl (Tab) PAXIL 20 MG TAKE ONE TABLET BY MOUTH ONCE DAILY        PARoxetine HCl (Tab) PAXIL 20 MG TAKE ONE TABLET BY MOUTH  ONCE DAILY        Sulfamethoxazole-Trimethoprim (Tab) BACTRIM -160 MG Take 1 Tab by mouth 2 times a day.        .                 Medicines prescribed today were sent to:     [facility] - [facility] [address] [place] [address]    Phone: [phone] Fax: [phone]    Open 24 Hours?: No    [facility] [address] [place] [address] - [address] [place] [address]    Phone: [phone] Fax: [phone]    Open 24 Hours?: No      Medication refill instructions:       If your prescription bottle indicates you have medication refills left, it is not necessary to call your provider’s office. Please contact your pharmacy and they will refill your medication.    If your prescription bottle indicates you do not have any refills left, you may request refills at any time through one of the following ways: The online Wallflower system (except Urgent Care), by calling your provider’s office, or by asking your pharmacy to contact your provider’s office with a refill request. Medication refills are processed only during regular business hours and may not be available until the next business day. Your provider may request additional information or to have a follow-up visit with you prior to refilling your medication.   *Please Note: Medication refills are assigned a new Rx number when refilled electronically. Your pharmacy may indicate that no refills were authorized even though a new prescription for the same medication is available at the pharmacy. Please request the medicine by name with the pharmacy before contacting your provider for a refill.        Your To Do List     Future Labs/Procedures Complete By Expires    DX-THORACIC SPINE-2 VIEWS  As directed 5/15/2018      Other Notes About Your Plan     Fall risk done 07/10/2015           Wallflower Access Code: [email or web address]  Expires: 6/14/2017  3:52 PM    Wallflower  A secure, online tool to manage your health  information     [employer]’s Re.nooble® is a secure, online tool that connects you to your personalized health information from the privacy of your home -- day or night - making it very easy for you to manage your healthcare. Once the activation process is completed, you can even access your medical information using the Re.nooble mike, which is available for free in the Apple Mike store or Google Play store.     Re.nooble provides the following levels of access (as shown below):   My Chart Features   [facility] Doctor [facility]  Specialists [facility]  Urgent  Care [facility]  Primary Care  Doctor   Email your healthcare team securely and privately 24/7 X X X    Manage appointments: schedule your next appointment; view details of past/upcoming appointments X      Request prescription refills. X      View recent personal medical records, including lab and immunizations X X X X   View health record, including health history, allergies, medications X X X X   Read reports about your outpatient visits, procedures, consult and ER notes X X X X   See your discharge summary, which is a recap of your hospital and/or ER visit that includes your diagnosis, lab results, and care plan. X X       How to register for Re.nooble:  1. Go to  [email or web address].  2. Click on the Sign Up Now box, which takes you to the New Member Sign Up page. You will need to provide the following information:  a. Enter your Re.nooble Access Code exactly as it appears at the top of this page. (You will not need to use this code after you’ve completed the sign-up process. If you do not sign up before the expiration date, you must request a new code.)   b. Enter your date of birth.   c. Enter your home email address.   d. Click Submit, and follow the next screen’s instructions.  3. Create a Re.nooble ID. This will be your Re.nooble login ID and cannot be changed, so think of one that is secure and easy to remember.  4. Create a Re.nooble password. You can  change your password at any time.  5. Enter your Password Reset Question and Answer. This can be used at a later time if you forget your password.   6. Enter your e-mail address. This allows you to receive e-mail notifications when new information is available in Dashbook.  7. Click Sign Up. You can now view your health information.    For assistance activating your Dashbook account, call [phone]        Quit Tobacco Information     Do you want to quit using tobacco?    Quitting tobacco decreases risks of cancer, heart and lung disease, increases life expectancy, improves sense of taste and smell, and increases spending money, among other benefits.    If you are thinking about quitting, we can help.  • [facility] Program: 171.937.3374  o Program occurs weekly for four weeks and includes pharmacist consultation on products to support quitting smoking or chewing tobacco. A provider referral is needed for pharmacist consultation.  • Tobacco Users Help Hotline: 9-800-QUIT-NOW (586-3304) or https://[place].quitlogix.org/  o Free, confidential telephone and online coaching for [place] residents. Sessions are designed on a schedule that is convenient for you. Eligible clients receive free nicotine replacement therapy.  • Nationally: www.smokefree.gov  o Information and professional assistance to support both immediate and long-term needs as you become, and remain, a non-smoker. Smokefree.gov allows you to choose the help that best fits your needs.

## 2017-05-22 RX ORDER — PAROXETINE HYDROCHLORIDE 20 MG/1
20 TABLET, FILM COATED ORAL DAILY
Qty: 30 TAB | Refills: 3 | Status: SHIPPED | OUTPATIENT
Start: 2017-05-22 | End: 2017-06-13

## 2017-05-26 DIAGNOSIS — F41.9 ANXIETY: ICD-10-CM

## 2017-05-26 RX ORDER — PAROXETINE HYDROCHLORIDE 20 MG/1
20 TABLET, FILM COATED ORAL DAILY
Qty: 30 TAB | Refills: 6 | Status: SHIPPED | OUTPATIENT
Start: 2017-05-26 | End: 2017-08-08

## 2017-05-30 ENCOUNTER — HOSPITAL ENCOUNTER (OUTPATIENT)
Dept: RADIOLOGY | Facility: MEDICAL CENTER | Age: 62
End: 2017-05-30
Attending: FAMILY MEDICINE
Payer: MEDICAID

## 2017-05-30 DIAGNOSIS — R52 PAIN: ICD-10-CM

## 2017-05-30 PROCEDURE — 72072 X-RAY EXAM THORAC SPINE 3VWS: CPT

## 2017-06-02 ENCOUNTER — TELEPHONE (OUTPATIENT)
Dept: MEDICAL GROUP | Facility: MEDICAL CENTER | Age: 62
End: 2017-06-02

## 2017-06-02 NOTE — TELEPHONE ENCOUNTER
Phone Number Called: [phone] (home)     Message: Left message on Pts answering service to please call the office Re: results.    Left Message for patient to call back: yes

## 2017-06-02 NOTE — TELEPHONE ENCOUNTER
----- Message from Kenya Hernandez M.D. sent at 6/1/2017 12:39 PM PDT -----  Please inform patient that her mammogram came back normal. We recommend a repeat mammogram in one year for breast cancer screening.    Kenya Hernandez M.D.

## 2017-06-05 ENCOUNTER — TELEPHONE (OUTPATIENT)
Dept: MEDICAL GROUP | Facility: MEDICAL CENTER | Age: 62
End: 2017-06-05

## 2017-06-05 NOTE — TELEPHONE ENCOUNTER
Phone Number Called: [phone] (home)       Message :Please inform patient that her mammogram came back normal. We recommend a repeat mammogram in one year for breast cancer screening.     Kenya Hernandez M.D.     Thoracic spine x-ray shows multiple levels of disc space narrowing and bone spur formation consistent with degenerative arthritic change. Can discuss at our next visit    Left Message for patient to call back: yes

## 2017-06-05 NOTE — TELEPHONE ENCOUNTER
----- Message from Dave Bobby M.D. sent at 6/4/2017 12:29 PM PDT -----  Thoracic spine x-ray shows multiple levels of disc space narrowing and bone spur formation consistent with degenerative arthritic change. Can discuss at our next visit

## 2017-06-13 ENCOUNTER — OFFICE VISIT (OUTPATIENT)
Dept: MEDICAL GROUP | Facility: MEDICAL CENTER | Age: 62
End: 2017-06-13
Attending: FAMILY MEDICINE
Payer: MEDICAID

## 2017-06-13 VITALS
HEIGHT: 64 IN | WEIGHT: 143 LBS | OXYGEN SATURATION: 95 % | BODY MASS INDEX: 24.41 KG/M2 | SYSTOLIC BLOOD PRESSURE: 102 MMHG | HEART RATE: 76 BPM | RESPIRATION RATE: 16 BRPM | DIASTOLIC BLOOD PRESSURE: 60 MMHG | TEMPERATURE: 97.2 F

## 2017-06-13 DIAGNOSIS — F41.9 ANXIETY: ICD-10-CM

## 2017-06-13 DIAGNOSIS — G89.29 CHRONIC MIDLINE THORACIC BACK PAIN: ICD-10-CM

## 2017-06-13 DIAGNOSIS — M54.6 CHRONIC MIDLINE THORACIC BACK PAIN: ICD-10-CM

## 2017-06-13 DIAGNOSIS — M54.12 CERVICAL RADICULOPATHY: ICD-10-CM

## 2017-06-13 PROCEDURE — 99213 OFFICE O/P EST LOW 20 MIN: CPT | Performed by: FAMILY MEDICINE

## 2017-06-13 RX ORDER — PAROXETINE 10 MG/1
10 TABLET, FILM COATED ORAL DAILY
Qty: 30 TAB | Refills: 6 | Status: SHIPPED | OUTPATIENT
Start: 2017-06-13 | End: 2017-08-08

## 2017-06-13 ASSESSMENT — PAIN SCALES - GENERAL: PAINLEVEL: 5=MODERATE PAIN

## 2017-06-13 NOTE — PROGRESS NOTES
"Subjective:      Honey Conde is a 61 y.o. female who presents with Follow-Up            HPI 1. Chronic thoracic pain-patient part she has been treated with chronic opiates since approximately 2014. She is currently taking a muscle relaxant. She has not taken routine NSAIDs but has a past history of GERD with intermittent symptoms from her hiatal hernia as well. No current constipation. Current midline back pain is usually in between her shoulder blades with rare radiation around to the left upper flank area.    ROS negative for cough, shortness of breath, anterior chest pain       Objective:     /60 mmHg  Pulse 76  Temp(Src) 36.2 °C (97.2 °F)  Resp 16  Ht 1.626 m (5' 4.02\")  Wt 64.864 kg (143 lb)  BMI 24.53 kg/m2  SpO2 95%  LMP 03/10/2004  Breastfeeding? No     Physical Exam  Gen.- alert, cooperative, in no acute distress  Neck- midline trachea, thyroid not enlarged or tender,supple, no cervical adenopathy  Chest-clear to auscultation and percussion with normal breath sounds. No retractions. Chest wall nontender  Cardiac- regular rhythm and rate. No murmur, thrill, or heave  Back-tender in the midline at approximately T8, and also tender at the left paralumbar area at about L4.          Assessment/Plan:     1. Anxiety-improved    2. Chronic midline thoracic back pain    - MR-THORACIC SPINE-W/O; Future    3. Cervical radiculopathy    - hydrocodone/APAP 5/325 mg (NORCO) 5-325 Tab; Take 1 Tab by mouth 2 times a day as needed.  Dispense: 60 Tab; Refill: 0   Plan: 1. Thoracic MRI  2. Reduce Norco 5/325 from 75 down to 60 per month  3. Reduce paroxetine to 10 mg size once daily  4. Revisit in one month      "

## 2017-06-13 NOTE — MR AVS SNAPSHOT
"        Honey Aguilarjunior   2017 10:50 AM   Office Visit   MRN: [id number]    Department:  [facility]   Dept Phone:  [phone]    Description:  Female : 1955   Provider:  Dave Bobby M.D.           Reason for Visit     Follow-Up           Allergies as of 2017     Allergen Noted Reactions    Ceclor [Cefaclor] 2012   Hives    Erythromycin 2012   Swelling    Pcn [Penicillins] 2012         You were diagnosed with     Anxiety   [941211]       Chronic midline thoracic back pain   [5585859]       Cervical radiculopathy   [112887]         Vital Signs     Blood Pressure Pulse Temperature Respirations Height Weight    102/60 mmHg 76 36.2 °C (97.2 °F) 16 1.626 m (5' 4.02\") 64.864 kg (143 lb)    Body Mass Index Oxygen Saturation Last Menstrual Period Breastfeeding? Smoking Status       24.53 kg/m2 95% 03/10/2004 No Current Every Day Smoker       Basic Information     Date Of Birth Sex Race Ethnicity Preferred Language    1955 Female White Non- English      Your appointments     2017 10:50 AM   Established Patient with Dave Bobby M.D.   [facility] ([facility])    [address] [place] [address] [id number] [phone]           You will be receiving a confirmation call a few days before your appointment from our automated call confirmation system.              Problem List              ICD-10-CM Priority Class Noted - Resolved    Depression F32.9   7/10/2015 - Present    Anxiety F41.9   7/10/2015 - Present    Cervical radiculopathy M54.12   7/10/2015 - Present    Recurrent major depressive disorder, in partial remission (CMS-HCC) F33.41   10/11/2016 - Present    Dyslipidemia E78.5   2017 - Present    Rectal bleeding K62.5   2017 - Present      Health Maintenance        Date Due Completion Dates    IMM DTaP/Tdap/Td Vaccine (1 - Tdap) 9/10/1976 ---    PAP SMEAR 2018 (N/S)    Override on 2015: (N/S)    MAMMOGRAM " 5/30/2018 5/30/2017, 4/26/2016, 2/10/2009    COLONOSCOPY 5/19/2022 5/19/2017            Current Immunizations     No immunizations on file.      Below and/or attached are the medications your provider expects you to take. Review all of your home medications and newly ordered medications with your provider and/or pharmacist. Follow medication instructions as directed by your provider and/or pharmacist. Please keep your medication list with you and share with your provider. Update the information when medications are discontinued, doses are changed, or new medications (including over-the-counter products) are added; and carry medication information at all times in the event of emergency situations     Allergies:  CECLOR - Hives     ERYTHROMYCIN - Swelling     PCN - (reactions not documented)               Medications  Valid as of: June 13, 2017 - 11:41 AM    Generic Name Brand Name Tablet Size Instructions for use    Baclofen (Tab) LIORESAL 20 MG TAKE ONE TABLET BY MOUTH TWICE DAILY        hydrocodone/APAP 5/325 mg (NORCO) 5-325 Tab (Tab) NORCO 5-325 Take 1 Tab by mouth 2 times a day as needed.        PARoxetine HCl (Tab) PAXIL 20 MG Take 1 Tab by mouth every day.        PARoxetine HCl (Tab) PAXIL 10 MG Take 1 Tab by mouth every day.        .                 Medicines prescribed today were sent to:     [facility] [address] [address]    Phone: [phone] Fax: [phone]    Open 24 Hours?: No    [facility] [address] [place], [place] - [address] [address]    Phone: [phone] Fax: [phone]    Open 24 Hours?: No    [facility] [address] [facility] ([place]), [place] - [address] [address] [facility] ([place]) [place] [address]    Phone: [phone] Fax: [phone]    Open 24 Hours?: No      Medication refill instructions:       If your prescription bottle indicates you have medication refills left, it is not necessary to call your  provider’s office. Please contact your pharmacy and they will refill your medication.    If your prescription bottle indicates you do not have any refills left, you may request refills at any time through one of the following ways: The online blogTV system (except Urgent Care), by calling your provider’s office, or by asking your pharmacy to contact your provider’s office with a refill request. Medication refills are processed only during regular business hours and may not be available until the next business day. Your provider may request additional information or to have a follow-up visit with you prior to refilling your medication.   *Please Note: Medication refills are assigned a new Rx number when refilled electronically. Your pharmacy may indicate that no refills were authorized even though a new prescription for the same medication is available at the pharmacy. Please request the medicine by name with the pharmacy before contacting your provider for a refill.        Your To Do List     Future Labs/Procedures Complete By Expires    MR-THORACIC SPINE-W/O  As directed 6/13/2018      Other Notes About Your Plan     Fall risk done 07/10/2015           blogTV Access Code: [email or web address]  Expires: 6/14/2017  3:52 PM    blogTV  A secure, online tool to manage your health information     [employer]’s blogTV® is a secure, online tool that connects you to your personalized health information from the privacy of your home -- day or night - making it very easy for you to manage your healthcare. Once the activation process is completed, you can even access your medical information using the blogTV mike, which is available for free in the Apple Mike store or Google Play store.     blogTV provides the following levels of access (as shown below):   My Chart Features   [facility] Doctor [facility]  Specialists [facility]  Urgent  Care [facility]  Primary Care  Doctor   Email your healthcare team securely and  privately 24/7 X X X    Manage appointments: schedule your next appointment; view details of past/upcoming appointments X      Request prescription refills. X      View recent personal medical records, including lab and immunizations X X X X   View health record, including health history, allergies, medications X X X X   Read reports about your outpatient visits, procedures, consult and ER notes X X X X   See your discharge summary, which is a recap of your hospital and/or ER visit that includes your diagnosis, lab results, and care plan. X X       How to register for Gazelle:  1. Go to  [email or web address].  2. Click on the Sign Up Now box, which takes you to the New Member Sign Up page. You will need to provide the following information:  a. Enter your Gazelle Access Code exactly as it appears at the top of this page. (You will not need to use this code after you’ve completed the sign-up process. If you do not sign up before the expiration date, you must request a new code.)   b. Enter your date of birth.   c. Enter your home email address.   d. Click Submit, and follow the next screen’s instructions.  3. Create a Gazelle ID. This will be your Gazelle login ID and cannot be changed, so think of one that is secure and easy to remember.  4. Create a Gazelle password. You can change your password at any time.  5. Enter your Password Reset Question and Answer. This can be used at a later time if you forget your password.   6. Enter your e-mail address. This allows you to receive e-mail notifications when new information is available in Gazelle.  7. Click Sign Up. You can now view your health information.    For assistance activating your Gazelle account, call [phone]        Quit Tobacco Information     Do you want to quit using tobacco?    Quitting tobacco decreases risks of cancer, heart and lung disease, increases life expectancy, improves sense of taste and smell, and increases spending money, among  other benefits.    If you are thinking about quitting, we can help.  • [facility] Q[facility] Tobacco Program: 988.896.2412  o Program occurs weekly for four weeks and includes pharmacist consultation on products to support quitting smoking or chewing tobacco. A provider referral is needed for pharmacist consultation.  • Tobacco Users Help Hotline: 1-800-QUIT-NOW (172-5088) or https://[place].quitlogix.org/  o Free, confidential telephone and online coaching for [place] residents. Sessions are designed on a schedule that is convenient for you. Eligible clients receive free nicotine replacement therapy.  • Nationally: www.smokefree.gov  o Information and professional assistance to support both immediate and long-term needs as you become, and remain, a non-smoker. Smokefree.gov allows you to choose the help that best fits your needs.

## 2017-06-20 ENCOUNTER — HOSPITAL ENCOUNTER (OUTPATIENT)
Dept: RADIOLOGY | Facility: MEDICAL CENTER | Age: 62
End: 2017-06-20
Attending: FAMILY MEDICINE
Payer: MEDICAID

## 2017-06-20 DIAGNOSIS — M54.6 CHRONIC MIDLINE THORACIC BACK PAIN: ICD-10-CM

## 2017-06-20 DIAGNOSIS — G89.29 CHRONIC MIDLINE THORACIC BACK PAIN: ICD-10-CM

## 2017-06-20 PROCEDURE — 72146 MRI CHEST SPINE W/O DYE: CPT

## 2017-07-11 ENCOUNTER — OFFICE VISIT (OUTPATIENT)
Dept: MEDICAL GROUP | Facility: MEDICAL CENTER | Age: 62
End: 2017-07-11
Attending: FAMILY MEDICINE
Payer: MEDICAID

## 2017-07-11 VITALS
WEIGHT: 148 LBS | BODY MASS INDEX: 25.39 KG/M2 | RESPIRATION RATE: 16 BRPM | DIASTOLIC BLOOD PRESSURE: 62 MMHG | SYSTOLIC BLOOD PRESSURE: 116 MMHG | TEMPERATURE: 98.1 F | OXYGEN SATURATION: 95 % | HEART RATE: 64 BPM

## 2017-07-11 DIAGNOSIS — M54.6 CHRONIC MIDLINE THORACIC BACK PAIN: ICD-10-CM

## 2017-07-11 DIAGNOSIS — G89.29 CHRONIC MIDLINE THORACIC BACK PAIN: ICD-10-CM

## 2017-07-11 PROCEDURE — 99213 OFFICE O/P EST LOW 20 MIN: CPT | Performed by: FAMILY MEDICINE

## 2017-07-11 ASSESSMENT — PAIN SCALES - GENERAL: PAINLEVEL: NO PAIN

## 2017-07-11 NOTE — MR AVS SNAPSHOT
Honey Conde   2017 10:50 AM   Office Visit   MRN: [id number]    Department:  [facility]   Dept Phone:  [phone]    Description:  Female : 1955   Provider:  Dave Bobby M.D.           Reason for Visit     Follow-Up           Allergies as of 2017     Allergen Noted Reactions    Ceclor [Cefaclor] 2012   Hives    Erythromycin 2012   Swelling    Pcn [Penicillins] 2012         You were diagnosed with     Cervical radiculopathy   [564253]         Vital Signs     Blood Pressure Pulse Temperature Respirations Weight Oxygen Saturation    116/62 mmHg 64 36.7 °C (98.1 °F) 16 67.132 kg (148 lb) 95%    Last Menstrual Period Breastfeeding? Smoking Status             03/10/2004 No Current Every Day Smoker         Basic Information     Date Of Birth Sex Race Ethnicity Preferred Language    1955 Female White Non- English      Your appointments     Aug 08, 2017 11:10 AM   Established Patient with Dave Bobby M.D.   [facility]    [address] [place] [id number]   [phone]           You will be receiving a confirmation call a few days before your appointment from our automated call confirmation system.              Problem List              ICD-10-CM Priority Class Noted - Resolved    Depression F32.9   7/10/2015 - Present    Anxiety F41.9   7/10/2015 - Present    Cervical radiculopathy M54.12   7/10/2015 - Present    Recurrent major depressive disorder, in partial remission (CMS-HCC) F33.41   10/11/2016 - Present    Dyslipidemia E78.5   2017 - Present    Rectal bleeding K62.5   2017 - Present      Health Maintenance        Date Due Completion Dates    IMM DTaP/Tdap/Td Vaccine (1 - Tdap) 9/10/1976 ---    IMM INFLUENZA (1) 2017 ---    PAP SMEAR 2018 (N/S)    Override on 2015: (N/S)    MAMMOGRAM 2018, 2016, 2/10/2009    COLONOSCOPY 2022               Current Immunizations     No immunizations on file.      Below and/or attached are the medications your provider expects you to take. Review all of your home medications and newly ordered medications with your provider and/or pharmacist. Follow medication instructions as directed by your provider and/or pharmacist. Please keep your medication list with you and share with your provider. Update the information when medications are discontinued, doses are changed, or new medications (including over-the-counter products) are added; and carry medication information at all times in the event of emergency situations     Allergies:  CECLOR - Hives     ERYTHROMYCIN - Swelling     PCN - (reactions not documented)               Medications  Valid as of: July 11, 2017 - 11:32 AM    Generic Name Brand Name Tablet Size Instructions for use    Baclofen (Tab) LIORESAL 20 MG TAKE ONE TABLET BY MOUTH TWICE DAILY        hydrocodone/APAP 5/325 mg (NORCO) 5-325 Tab (Tab) NORCO 5-325 Take 1 Tab by mouth 2 times a day as needed.        PARoxetine HCl (Tab) PAXIL 20 MG Take 1 Tab by mouth every day.        PARoxetine HCl (Tab) PAXIL 10 MG Take 1 Tab by mouth every day.        .                 Medicines prescribed today were sent to:     [facility] [address] [address]    Phone: [phone] Fax: [phone]    Open 24 Hours?: No    [facility] [address] [place] [address] [address] [address]    Phone: [phone] Fax: [phone]    Open 24 Hours?: No    [facility] ([place]), [place] - [address] ([place]) [place] [address]    Phone: [phone] Fax: [phone]    Open 24 Hours?: No      Medication refill instructions:       If your prescription bottle indicates you have medication refills left, it is not necessary to call your provider’s office. Please contact your pharmacy and they will refill your  medication.    If your prescription bottle indicates you do not have any refills left, you may request refills at any time through one of the following ways: The online Imaginatik system (except Urgent Care), by calling your provider’s office, or by asking your pharmacy to contact your provider’s office with a refill request. Medication refills are processed only during regular business hours and may not be available until the next business day. Your provider may request additional information or to have a follow-up visit with you prior to refilling your medication.   *Please Note: Medication refills are assigned a new Rx number when refilled electronically. Your pharmacy may indicate that no refills were authorized even though a new prescription for the same medication is available at the pharmacy. Please request the medicine by name with the pharmacy before contacting your provider for a refill.        Other Notes About Your Plan     Fall risk done 07/10/2015           Imaginatik Access Code: [email or web address]  Expires: 8/10/2017 11:32 AM    Imaginatik  A secure, online tool to manage your health information     meebee’s Imaginatik® is a secure, online tool that connects you to your personalized health information from the privacy of your home -- day or night - making it very easy for you to manage your healthcare. Once the activation process is completed, you can even access your medical information using the Imaginatik mike, which is available for free in the Apple Mike store or Google Play store.     Imaginatik provides the following levels of access (as shown below):   My Chart Features   [facility] Doctor [facility]  Specialists [facility]  Urgent  Care Non-[facility]  Primary Care  Doctor   Email your healthcare team securely and privately 24/7 X X X    Manage appointments: schedule your next appointment; view details of past/upcoming appointments X      Request prescription refills. X      View recent personal medical  records, including lab and immunizations X X X X   View health record, including health history, allergies, medications X X X X   Read reports about your outpatient visits, procedures, consult and ER notes X X X X   See your discharge summary, which is a recap of your hospital and/or ER visit that includes your diagnosis, lab results, and care plan. X X       How to register for 2NGageU:  1. Go to  [email or web address].  2. Click on the Sign Up Now box, which takes you to the New Member Sign Up page. You will need to provide the following information:  a. Enter your 2NGageU Access Code exactly as it appears at the top of this page. (You will not need to use this code after you’ve completed the sign-up process. If you do not sign up before the expiration date, you must request a new code.)   b. Enter your date of birth.   c. Enter your home email address.   d. Click Submit, and follow the next screen’s instructions.  3. Create a 2NGageU ID. This will be your 2NGageU login ID and cannot be changed, so think of one that is secure and easy to remember.  4. Create a 2NGageU password. You can change your password at any time.  5. Enter your Password Reset Question and Answer. This can be used at a later time if you forget your password.   6. Enter your e-mail address. This allows you to receive e-mail notifications when new information is available in 2NGageU.  7. Click Sign Up. You can now view your health information.    For assistance activating your 2NGageU account, call [phone]        Quit Tobacco Information     Do you want to quit using tobacco?    Quitting tobacco decreases risks of cancer, heart and lung disease, increases life expectancy, improves sense of taste and smell, and increases spending money, among other benefits.    If you are thinking about quitting, we can help.  • [facility] Quit Tobacco Program: [phone]  o Program occurs weekly for four weeks and includes pharmacist consultation on  products to support quitting smoking or chewing tobacco. A provider referral is needed for pharmacist consultation.  • Tobacco Users Help Hotline: 3-800QUIT-NOW (238-5725) or https://[place].quitlogix.org/  o Free, confidential telephone and online coaching for [place] residents. Sessions are designed on a schedule that is convenient for you. Eligible clients receive free nicotine replacement therapy.  • Nationally: www.smokefree.gov  o Information and professional assistance to support both immediate and long-term needs as you become, and remain, a non-smoker. Smokefree.gov allows you to choose the help that best fits your needs.

## 2017-07-12 NOTE — PROGRESS NOTES
Subjective:      Honey Conde is a 61 y.o. female who presents with Follow-Up            HPI 1. Thoracic pain-patient reports thoracic pain has improved significantly with starting a calcium supplement. Patient did complete thoracic MRI which showed some mild disc bulges but no significant central canal or foraminal stenosis at any level patient reports that she has tolerated reducing Norco 5/325 down from 75 doses to 60 doses over the past month. She denies constipation, unexplained diaphoresis    ROS negative for shortness of breath, chest pain, upper extremity numbness       Objective:     /62 mmHg  Pulse 64  Temp(Src) 36.7 °C (98.1 °F)  Resp 16  Wt 67.132 kg (148 lb)  SpO2 95%  LMP 03/10/2004  Breastfeeding? No     Physical Exam  Gen.- alert, cooperative, in no acute distress  Neck- midline trachea, thyroid not enlarged or tender,supple, no cervical adenopathy  Chest-clear to auscultation and percussion with normal breath sounds. No retractions. Chest wall nontender  Cardiac- regular rhythm and rate. No murmur, thrill, or heave  Back-nontender to external palpation over the bony midline.          Assessment/Plan:     1. Chronic midline thoracic back pain -improved      Plan: 1. Continue complete taper off of paroxetine-take one half of a 10 mg tablet ×5 doses and then one half of a 10 mg tablet every other day for 5 more doses then discontinue  2. Renew Norco 5 mg, #60  3. Continue calcium supplementation  4. Revisit one month

## 2017-07-24 DIAGNOSIS — M62.838 MUSCLE SPASM: ICD-10-CM

## 2017-07-24 RX ORDER — BACLOFEN 20 MG/1
TABLET ORAL
Qty: 60 TAB | Refills: 6 | Status: SHIPPED | OUTPATIENT
Start: 2017-07-24 | End: 2017-08-08 | Stop reason: SDUPTHER

## 2017-08-08 ENCOUNTER — OFFICE VISIT (OUTPATIENT)
Dept: MEDICAL GROUP | Facility: MEDICAL CENTER | Age: 62
End: 2017-08-08
Attending: FAMILY MEDICINE
Payer: MEDICAID

## 2017-08-08 VITALS
DIASTOLIC BLOOD PRESSURE: 58 MMHG | HEIGHT: 64 IN | SYSTOLIC BLOOD PRESSURE: 102 MMHG | HEART RATE: 72 BPM | OXYGEN SATURATION: 93 % | WEIGHT: 145 LBS | BODY MASS INDEX: 24.75 KG/M2 | TEMPERATURE: 98.1 F | RESPIRATION RATE: 16 BRPM

## 2017-08-08 DIAGNOSIS — M62.838 MUSCLE SPASM: ICD-10-CM

## 2017-08-08 DIAGNOSIS — M54.12 CERVICAL RADICULOPATHY: ICD-10-CM

## 2017-08-08 PROCEDURE — 99213 OFFICE O/P EST LOW 20 MIN: CPT | Performed by: FAMILY MEDICINE

## 2017-08-08 RX ORDER — BACLOFEN 20 MG/1
TABLET ORAL
Qty: 60 TAB | Refills: 6 | Status: SHIPPED | OUTPATIENT
Start: 2017-08-08 | End: 2018-02-16 | Stop reason: SDUPTHER

## 2017-08-08 ASSESSMENT — PAIN SCALES - GENERAL: PAINLEVEL: NO PAIN

## 2017-08-08 NOTE — PROGRESS NOTES
"Subjective:      Honey Conde is a 61 y.o. female who presents with Anxiety            Anxiety         1. Cervical radiculopathy-patient reports daily usually moderate levels of posterior inferior neck pain associated with the day-to-day work ( at [employer]). She will also have intermittent low back pain and occasional pain in her left ankle at the site of previous orthopedic fracture repair. She has been getting along and remaining very functional using only 2 Norco 5/325 tablets per day. She also uses baclofen as a muscle relaxant.    ROS negative for current significant anxiety, depression, confusion       Objective:     /58 mmHg  Pulse 72  Temp(Src) 36.7 °C (98.1 °F)  Resp 16  Ht 1.626 m (5' 4.02\")  Wt 65.772 kg (145 lb)  BMI 24.88 kg/m2  SpO2 93%  LMP 03/10/2004  Breastfeeding? No     Physical Exam  Gen.-alert cooperative female in no acute distress  Gen.- alert, cooperative, in no acute distress  Neck- midline trachea, thyroid not enlarged or tender,supple, no cervical adenopathy . 1+ tender on the left posterolateral aspect of the neck to palpation, marked reduction of neck extension and left rotation  Chest-clear to auscultation and percussion with normal breath sounds. No retractions. Chest wall nontender  Cardiac- regular rhythm and rate. No murmur, thrill, or heave            Assessment/Plan:     1. Muscle spasm      2. Cervical radiculopathy    - [facility] PAIN MANAGEMENT SCREEN; Future    Plan: 1. Update UDS today  2. Renew Norco 5/325, #60 (if current UDS returns appropriate will write an additional 2 separate 1 month prescriptions for patient pickup with revisit in 3 months).    "

## 2017-08-08 NOTE — MR AVS SNAPSHOT
"        Hnoey Conde   2017 11:10 AM   Office Visit   MRN: [id number]    [facility]   Dept Phone:  [phone]    Description:  Female : 1955   Provider:  Dave Bobby M.D.           Reason for Visit     Anxiety           Allergies as of 2017     Allergen Noted Reactions    Ceclor [Cefaclor] 2012   Hives    Erythromycin 2012   Swelling    Pcn [Penicillins] 2012         You were diagnosed with     Muscle spasm   [495714]       Cervical radiculopathy   [[id number]]         Vital Signs     Blood Pressure Pulse Temperature Respirations Height Weight    102/58 mmHg 72 36.7 °C (98.1 °F) 16 1.626 m (5' 4.02\") 65.772 kg (145 lb)    Body Mass Index Oxygen Saturation Last Menstrual Period Breastfeeding? Smoking Status       24.88 kg/m2 93% 03/10/2004 No Current Every Day Smoker       Basic Information     Date Of Birth Sex Race Ethnicity Preferred Language    1955 Female White Non- English      Problem List              ICD-10-CM Priority Class Noted - Resolved    Depression F32.9   7/10/2015 - Present    Anxiety F41.9   7/10/2015 - Present    Cervical radiculopathy M54.12   7/10/2015 - Present    Recurrent major depressive disorder, in partial remission (CMS-HCC) F33.41   10/11/2016 - Present    Dyslipidemia E78.5   2017 - Present    Rectal bleeding K62.5   2017 - Present      Health Maintenance        Date Due Completion Dates    IMM DTaP/Tdap/Td Vaccine (1 - Tdap) 9/10/1976 ---    IMM INFLUENZA (1) 2017 ---    PAP SMEAR 2018 (N/S)    Override on 2015: (N/S)    MAMMOGRAM 2018, 2016, 2/10/2009    COLONOSCOPY 2022            Current Immunizations     No immunizations on file.      Below and/or attached are the medications your provider expects you to take. Review all of your home medications and newly ordered medications with your provider and/or pharmacist. Follow medication " instructions as directed by your provider and/or pharmacist. Please keep your medication list with you and share with your provider. Update the information when medications are discontinued, doses are changed, or new medications (including over-the-counter products) are added; and carry medication information at all times in the event of emergency situations     Allergies:  CECLOR - Hives     ERYTHROMYCIN - Swelling     PCN - (reactions not documented)               Medications  Valid as of: August 08, 2017 - 11:53 AM    Generic Name Brand Name Tablet Size Instructions for use    Baclofen (Tab) LIORESAL 20 MG TAKE ONE TABLET BY MOUTH TWICE DAILY        hydrocodone/APAP 5/325 mg (NORCO) 5-325 Tab (Tab) NORCO 5-325 Take 1 Tab by mouth 2 times a day as needed.        .                 Medicines prescribed today were sent to:     [facility] [address] [address]    Phone: [phone] Fax: [phone]    Open 24 Hours?: No    [facility] [address] [place] [address] - [address] [address]    Phone: [phone] Fax: [phone]    Open 24 Hours?: No    [facility] ([place]), [place] - [address] [address] [facility] (S) [place] [address]    Phone: [phone] Fax: [phone]    Open 24 Hours?: No      Medication refill instructions:       If your prescription bottle indicates you have medication refills left, it is not necessary to call your provider’s office. Please contact your pharmacy and they will refill your medication.    If your prescription bottle indicates you do not have any refills left, you may request refills at any time through one of the following ways: The online Xmybox system (except Urgent Care), by calling your provider’s office, or by asking your pharmacy to contact your provider’s office with a refill request. Medication refills are processed only during regular business hours and may not be  available until the next business day. Your provider may request additional information or to have a follow-up visit with you prior to refilling your medication.   *Please Note: Medication refills are assigned a new Rx number when refilled electronically. Your pharmacy may indicate that no refills were authorized even though a new prescription for the same medication is available at the pharmacy. Please request the medicine by name with the pharmacy before contacting your provider for a refill.        Your To Do List     Future Labs/Procedures Complete By Expires    [facility] PAIN MANAGEMENT SCREEN  As directed 8/8/2018    Comments:    Current Meds (name, sig, last dose):   Current outpatient prescriptions:   •  baclofen, TAKE ONE TABLET BY MOUTH TWICE DAILY  •  hydrocodone/APAP 5/325 mg, 1 Tab, Oral, BID PRN            Other Notes About Your Plan     Fall risk done 07/10/2015           3Guppies Access Code: [email or web address]  Expires: 8/10/2017 11:32 AM    3Guppies  A secure, online tool to manage your health information     [employer]’s 3Guppies® is a secure, online tool that connects you to your personalized health information from the privacy of your home -- day or night - making it very easy for you to manage your healthcare. Once the activation process is completed, you can even access your medical information using the 3Guppies mike, which is available for free in the Apple Mike store or Google Play store.     3Guppies provides the following levels of access (as shown below):   My Chart Features   [facility] Doctor [facility]  Specialists [facility]  Urgent  Care [facility]  Primary Care  Doctor   Email your healthcare team securely and privately 24/7 X X X    Manage appointments: schedule your next appointment; view details of past/upcoming appointments X      Request prescription refills. X      View recent personal medical records, including lab and immunizations X X X X   View health record, including health  history, allergies, medications X X X X   Read reports about your outpatient visits, procedures, consult and ER notes X X X X   See your discharge summary, which is a recap of your hospital and/or ER visit that includes your diagnosis, lab results, and care plan. X X       How to register for Bankofpoker:  1. Go to  [email or web address].  2. Click on the Sign Up Now box, which takes you to the New Member Sign Up page. You will need to provide the following information:  a. Enter your Bankofpoker Access Code exactly as it appears at the top of this page. (You will not need to use this code after you’ve completed the sign-up process. If you do not sign up before the expiration date, you must request a new code.)   b. Enter your date of birth.   c. Enter your home email address.   d. Click Submit, and follow the next screen’s instructions.  3. Create a Bankofpoker ID. This will be your Bankofpoker login ID and cannot be changed, so think of one that is secure and easy to remember.  4. Create a Bankofpoker password. You can change your password at any time.  5. Enter your Password Reset Question and Answer. This can be used at a later time if you forget your password.   6. Enter your e-mail address. This allows you to receive e-mail notifications when new information is available in Bankofpoker.  7. Click Sign Up. You can now view your health information.    For assistance activating your Bankofpoker account, call [phone]        Quit Tobacco Information     Do you want to quit using tobacco?    Quitting tobacco decreases risks of cancer, heart and lung disease, increases life expectancy, improves sense of taste and smell, and increases spending money, among other benefits.    If you are thinking about quitting, we can help.  • [facility] Quit Tobacco Program: 733.699.2421  o Program occurs weekly for four weeks and includes pharmacist consultation on products to support quitting smoking or chewing tobacco. A provider referral is needed  for pharmacist consultation.  • Tobacco Users Help Hotline: 8-800-QUIT-NOW (508-2767) or https://[place].quitlogix.org/  o Free, confidential telephone and online coaching for [place] residents. Sessions are designed on a schedule that is convenient for you. Eligible clients receive free nicotine replacement therapy.  • Nationally: www.smokefree.gov  o Information and professional assistance to support both immediate and long-term needs as you become, and remain, a non-smoker. Smokefree.gov allows you to choose the help that best fits your needs.

## 2017-09-07 NOTE — TELEPHONE ENCOUNTER
Was the patient seen in the last year in this department? Yes     Does patient have an active prescription for medications requested? No     Received Request Via: Patient, per last visit note please print 2 Rx's for the next 2 months as pt's UDS was appropriate.

## 2017-10-13 ENCOUNTER — TELEPHONE (OUTPATIENT)
Dept: MEDICAL GROUP | Facility: MEDICAL CENTER | Age: 62
End: 2017-10-13

## 2017-10-13 NOTE — TELEPHONE ENCOUNTER
Prescription for Norco written today and a second prescription written today as well. Patient will need to have a in office visit in December

## 2017-11-17 ENCOUNTER — OFFICE VISIT (OUTPATIENT)
Dept: MEDICAL GROUP | Facility: MEDICAL CENTER | Age: 62
End: 2017-11-17
Attending: FAMILY MEDICINE
Payer: MEDICAID

## 2017-11-17 VITALS
HEIGHT: 64 IN | BODY MASS INDEX: 26.8 KG/M2 | RESPIRATION RATE: 16 BRPM | DIASTOLIC BLOOD PRESSURE: 60 MMHG | HEART RATE: 72 BPM | OXYGEN SATURATION: 98 % | TEMPERATURE: 97.3 F | WEIGHT: 157 LBS | SYSTOLIC BLOOD PRESSURE: 104 MMHG

## 2017-11-17 DIAGNOSIS — M54.12 CERVICAL RADICULOPATHY: ICD-10-CM

## 2017-11-17 DIAGNOSIS — R35.0 URINARY FREQUENCY: ICD-10-CM

## 2017-11-17 PROCEDURE — 99212 OFFICE O/P EST SF 10 MIN: CPT | Performed by: FAMILY MEDICINE

## 2017-11-17 PROCEDURE — 99213 OFFICE O/P EST LOW 20 MIN: CPT | Performed by: FAMILY MEDICINE

## 2017-11-17 ASSESSMENT — PAIN SCALES - GENERAL: PAINLEVEL: 2=MINIMAL-SLIGHT

## 2017-11-17 NOTE — PROGRESS NOTES
"Subjective:      Honey Conde is a 62 y.o. female who presents with Back Pain            HPI 1. Cervical radiculopathy-patient reports she is continuing to work 5 days per week. She is utilizing Norco 5/325 one pill twice daily. She does note daily pain on either side of her neck. Rarely that pain will radiate down to her right hand. There is no sustained upper extremity numbness.    ROS negative for recent anxiety (court issues resolved), no recent hair loss, cold intolerance       Objective:     /60   Pulse 72   Temp 36.3 °C (97.3 °F)   Resp 16   Ht 1.626 m (5' 4.02\")   Wt 71.2 kg (157 lb)   LMP 03/10/2004   SpO2 98%   Breastfeeding? No   BMI 26.94 kg/m²      Physical Exam  Gen.-alert cooperative female in no acute distress  Neck-1+ tender at the mid to lower lateral left side of the neck on palpation. Range of motion shows good left and right rotation and flexion. Extension is moderately limited  Upper extremities-intact light touch, intact strength          Assessment/Plan:     1. Urinary frequency    - COMP METABOLIC PANEL; Future    2. Cervical radiculopathy -Will continue limited dose of Norco 5/325 2 treat recurrent neck pain and allow patient to maintain her current active daily schedule working.    Plan: 1. Norco 5/325, #60, 3 prescriptions written  2. Revisit in 3 months, sooner if needed  3. Continue baclofen 20 mg twice daily as needed for muscle spasms.  "

## 2018-02-16 ENCOUNTER — OFFICE VISIT (OUTPATIENT)
Dept: MEDICAL GROUP | Facility: MEDICAL CENTER | Age: 63
End: 2018-02-16
Attending: FAMILY MEDICINE
Payer: MEDICAID

## 2018-02-16 VITALS
HEART RATE: 92 BPM | OXYGEN SATURATION: 94 % | WEIGHT: 161 LBS | HEIGHT: 64 IN | SYSTOLIC BLOOD PRESSURE: 122 MMHG | TEMPERATURE: 98 F | RESPIRATION RATE: 16 BRPM | DIASTOLIC BLOOD PRESSURE: 70 MMHG | BODY MASS INDEX: 27.49 KG/M2

## 2018-02-16 DIAGNOSIS — G89.29 CHRONIC FOOT PAIN, LEFT: ICD-10-CM

## 2018-02-16 DIAGNOSIS — M54.12 CERVICAL RADICULOPATHY: ICD-10-CM

## 2018-02-16 DIAGNOSIS — M79.672 CHRONIC FOOT PAIN, LEFT: ICD-10-CM

## 2018-02-16 DIAGNOSIS — M62.838 MUSCLE SPASM: ICD-10-CM

## 2018-02-16 DIAGNOSIS — M51.34 THORACIC DEGENERATIVE DISC DISEASE: ICD-10-CM

## 2018-02-16 PROCEDURE — 99214 OFFICE O/P EST MOD 30 MIN: CPT | Performed by: FAMILY MEDICINE

## 2018-02-16 PROCEDURE — 99213 OFFICE O/P EST LOW 20 MIN: CPT | Performed by: FAMILY MEDICINE

## 2018-02-16 RX ORDER — BACLOFEN 20 MG/1
TABLET ORAL
Qty: 60 TAB | Refills: 6 | Status: SHIPPED | OUTPATIENT
Start: 2018-02-16 | End: 2018-06-13

## 2018-02-16 ASSESSMENT — PATIENT HEALTH QUESTIONNAIRE - PHQ9: CLINICAL INTERPRETATION OF PHQ2 SCORE: 0

## 2018-02-16 ASSESSMENT — PAIN SCALES - GENERAL: PAINLEVEL: 4=SLIGHT-MODERATE PAIN

## 2018-02-17 NOTE — PROGRESS NOTES
"Subjective:      Honey Conde is a 62 y.o. female who presents with Anxiety            HPI 1. Chronic pain-patient returns for quarterly follow-up for persistent pain generated by her neck, left foot and ankle (status post ORIF) (, and disc degenerative disease in her mid back with mid back spasms as well. Not reporting any persistent upper extremity numbness or focal weakness. 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    ROS negative for abdominal distention, constipation, confusion       Objective:     /70   Pulse 92   Temp 36.7 °C (98 °F)   Resp 16   Ht 1.626 m (5' 4.02\")   Wt 73 kg (161 lb)   LMP 03/10/2004   SpO2 94%   Breastfeeding? No   BMI 27.62 kg/m²       Physical Exam  Gen.- alert, cooperative, in no acute distress  Neck- midline trachea, thyroid not enlarged or tender,supple, no cervical adenopathy. 1+ tender over the right lateral aspect of the neck  Chest-clear to auscultation and percussion with normal breath sounds. No retractions. Chest wall nontender  Cardiac- regular rhythm and rate. No murmur, thrill, or heave  Abdomen- normal bowel sounds, soft without guarding. Liver and spleen not enlarged, no palpable masses or tenderness.          Assessment/Plan:     1. Muscle spasm    - baclofen (LIORESAL) 20 MG tablet; TAKE ONE TABLET BY MOUTH TWICE DAILY  Dispense: 60 Tab; Refill: 6  - CONSENT FOR OPIATE PRESCRIPTION    2. Cervical radiculopathy    - CONSENT FOR OPIATE PRESCRIPTION  - hydrocodone/APAP 5/325 mg (NORCO) 5-325 Tab; Take 1 Tab by mouth 2 times a day as needed for up to 30 days.  Dispense: 60 Tab; Refill: 0    3. Thoracic degenerative disc disease    - CONSENT FOR OPIATE PRESCRIPTION  - hydrocodone/APAP 5/325 mg (NORCO) 5-325 Tab; Take 1 Tab by mouth 2 times a day as needed for up to 30 days.  Dispense: 60 Tab; Refill: 0    4. Chronic foot pain, left    - CONSENT FOR OPIATE PRESCRIPTION  - " hydrocodone/APAP 5/325 mg (NORCO) 5-325 Tab; Take 1 Tab by mouth 2 times a day as needed for up to 30 days.  Dispense: 60 Tab; Refill: 0    Plan: 1. Opioid risk tool reviewed,  reviewed. Prescriptions for Norco 5/325 #60 written ×3  2. Revisit with me in 3 months  3. UDS, [facility], collected today

## 2018-04-02 ENCOUNTER — OFFICE VISIT (OUTPATIENT)
Dept: MEDICAL GROUP | Facility: MEDICAL CENTER | Age: 63
End: 2018-04-02
Attending: FAMILY MEDICINE
Payer: MEDICAID

## 2018-04-02 VITALS
DIASTOLIC BLOOD PRESSURE: 76 MMHG | HEART RATE: 80 BPM | HEIGHT: 64 IN | RESPIRATION RATE: 16 BRPM | BODY MASS INDEX: 28.1 KG/M2 | SYSTOLIC BLOOD PRESSURE: 124 MMHG | TEMPERATURE: 97.7 F | WEIGHT: 164.6 LBS | OXYGEN SATURATION: 94 %

## 2018-04-02 DIAGNOSIS — N81.11 CYSTOCELE, MIDLINE: ICD-10-CM

## 2018-04-02 LAB
APPEARANCE UR: CLEAR
BILIRUB UR STRIP-MCNC: NORMAL MG/DL
COLOR UR AUTO: YELLOW
GLUCOSE UR STRIP.AUTO-MCNC: NORMAL MG/DL
KETONES UR STRIP.AUTO-MCNC: NORMAL MG/DL
LEUKOCYTE ESTERASE UR QL STRIP.AUTO: NORMAL
NITRITE UR QL STRIP.AUTO: NORMAL
PH UR STRIP.AUTO: 5 [PH] (ref 5–8)
PROT UR QL STRIP: NORMAL MG/DL
RBC UR QL AUTO: NORMAL
SP GR UR STRIP.AUTO: 1.03
UROBILINOGEN UR STRIP-MCNC: NORMAL MG/DL

## 2018-04-02 PROCEDURE — 99213 OFFICE O/P EST LOW 20 MIN: CPT | Performed by: FAMILY MEDICINE

## 2018-04-02 PROCEDURE — 99212 OFFICE O/P EST SF 10 MIN: CPT | Performed by: FAMILY MEDICINE

## 2018-04-02 PROCEDURE — 81002 URINALYSIS NONAUTO W/O SCOPE: CPT

## 2018-04-02 NOTE — PROGRESS NOTES
"Subjective:      Honey Conde is a 62 y.o. female who presents with Vaginal Itching            HPI 1. Cystocele-patient reports over the past month she has had increasing feeling of vaginal pressure and now actually senses a protrusion of moist inner tissue down onto the surface of her panties. She has not seen any obvious bleeding and there is been no obvious vaginal discharge. She does report that she will have intermittent urge incontinence where she just cannot get to the bathroom fast enough in the past month and may lose half an ounce to an ounce of urine. She is status post vaginal delivery of 2 children each under 7 pounds, and a laparoscopy for a tubal pregnancy and tubal ligation on a single occasion.    ROS negative for constipation, fecal incontinence, fever       Objective:     /76   Pulse 80   Temp 36.5 °C (97.7 °F)   Resp 16   Ht 1.626 m (5' 4.02\")   Wt 74.7 kg (164 lb 9.6 oz)   SpO2 94%   BMI 28.24 kg/m²      Physical Exam  General-alert cooperative female in no acute distress  Abdomen- normal bowel sounds, soft without guarding. Liver and spleen not enlarged, no palpable masses or tenderness.  -normal female external genitalia vagina is clear without purulent discharge or bleeding. With Valsalva there is modest bulging of the anterior wall of the vagina suggesting a grade 2 cystocele          Assessment/Plan:     1. Cystocele, midline  Plan: 1. GYN referral for evaluation and consideration of surgical therapy versus possibly utilizing a pessary.      "

## 2018-04-20 ENCOUNTER — OFFICE VISIT (OUTPATIENT)
Dept: MEDICAL GROUP | Facility: MEDICAL CENTER | Age: 63
End: 2018-04-20
Attending: FAMILY MEDICINE
Payer: MEDICAID

## 2018-04-20 VITALS
WEIGHT: 166 LBS | RESPIRATION RATE: 16 BRPM | HEART RATE: 84 BPM | SYSTOLIC BLOOD PRESSURE: 110 MMHG | TEMPERATURE: 97.3 F | OXYGEN SATURATION: 92 % | BODY MASS INDEX: 28.34 KG/M2 | HEIGHT: 64 IN | DIASTOLIC BLOOD PRESSURE: 62 MMHG

## 2018-04-20 DIAGNOSIS — R30.0 DYSURIA: ICD-10-CM

## 2018-04-20 PROCEDURE — 81002 URINALYSIS NONAUTO W/O SCOPE: CPT | Performed by: FAMILY MEDICINE

## 2018-04-20 PROCEDURE — 99212 OFFICE O/P EST SF 10 MIN: CPT | Performed by: FAMILY MEDICINE

## 2018-04-20 PROCEDURE — 99213 OFFICE O/P EST LOW 20 MIN: CPT | Mod: 25 | Performed by: FAMILY MEDICINE

## 2018-04-20 PROCEDURE — 99213 OFFICE O/P EST LOW 20 MIN: CPT | Performed by: FAMILY MEDICINE

## 2018-04-20 RX ORDER — BENZOCAINE/MENTHOL 6 MG-10 MG
LOZENGE MUCOUS MEMBRANE
Qty: 1 TUBE | Refills: 0 | Status: SHIPPED | OUTPATIENT
Start: 2018-04-20 | End: 2018-06-13

## 2018-04-20 ASSESSMENT — PAIN SCALES - GENERAL: PAINLEVEL: 10=SEVERE PAIN

## 2018-04-20 NOTE — PROGRESS NOTES
Subjective:      Honey Conde is a 62 y.o. female who presents with No chief complaint on file.            HPI 1. Dysuria/pelvic itching-patient was diagnosed 2 weeks ago in our office with significant cystocele. She has been developing that problem over the past 4-5 months. She notes over the past 10 days issues with dysuria and external lower pelvic area itching. She was actually seen at [facility] one week ago. She reports that she had a negative urine analysis there and no treatment was recommended. Patient is already scheduled to see gynecology in that visit actually is coming up in 4 days.   ROS negative for current fever, vaginal itching, hematuria       Objective:     /62   Pulse 84   Temp 36.3 °C (97.3 °F)   Resp 16   Wt 75.3 kg (166 lb)   SpO2 92%   Breastfeeding? No   BMI 28.48 kg/m²      Physical Exam  General-alert cooperative female in no acute distress  Abdomen-slightly distended contour with mild suprapubic tenderness. Audible bowel sounds. No rebound tenderness  UA-negative for WBCs, RBCs, nitrates          Assessment/Plan:     1. Dysuria    1. Short-term trial of hydrocortisone cream externally twice daily for current itching pending GYN eval

## 2018-05-24 ENCOUNTER — OFFICE VISIT (OUTPATIENT)
Dept: MEDICAL GROUP | Facility: MEDICAL CENTER | Age: 63
End: 2018-05-24
Attending: FAMILY MEDICINE
Payer: MEDICAID

## 2018-05-24 VITALS
HEIGHT: 64 IN | OXYGEN SATURATION: 95 % | RESPIRATION RATE: 16 BRPM | TEMPERATURE: 98.6 F | DIASTOLIC BLOOD PRESSURE: 68 MMHG | BODY MASS INDEX: 27.45 KG/M2 | WEIGHT: 160.8 LBS | SYSTOLIC BLOOD PRESSURE: 108 MMHG | HEART RATE: 72 BPM

## 2018-05-24 DIAGNOSIS — M54.12 CERVICAL RADICULOPATHY: ICD-10-CM

## 2018-05-24 DIAGNOSIS — M79.672 CHRONIC FOOT PAIN, LEFT: ICD-10-CM

## 2018-05-24 DIAGNOSIS — M51.34 THORACIC DEGENERATIVE DISC DISEASE: ICD-10-CM

## 2018-05-24 DIAGNOSIS — G89.29 CHRONIC FOOT PAIN, LEFT: ICD-10-CM

## 2018-05-24 PROCEDURE — 99213 OFFICE O/P EST LOW 20 MIN: CPT | Performed by: FAMILY MEDICINE

## 2018-05-24 ASSESSMENT — PAIN SCALES - GENERAL: PAINLEVEL: 5=MODERATE PAIN

## 2018-05-24 NOTE — PROGRESS NOTES
Subjective:      Honey Conde is a 62 y.o. female who presents with No chief complaint on file.            HPI 1. Chronic neck pain-patient reports continued posterior right-sided neck pain at a level of 7-8 out of 10 on a daily basis. Is fairly responsive to the low-dose Norco 5 mg that she takes twice a day. Patient reports that she will likely be having a simple hysterectomy along with bladder suspension to treat her cystocele with a gynecologist in the next 1 month. Anticipate transient increase in her narcotic use for the first week postoperatively.    ROS negative for abdominal bloating, confusion, constipation       Objective:     Vitals: Temperature 98.6°F, pulse 72, oxygenation 95%, respirations 16, weight 160.8 pounds, /62    Physical Exam  Gen.- alert, cooperative, in no acute distress  Neck- midline trachea, thyroid not enlarged or tender,supple, no cervical adenopathy 1+ tender at the right lower lateral base of the neck to palpation  Chest-clear to auscultation and percussion with normal breath sounds. No retractions. Chest wall nontender  Cardiac- regular rhythm and rate. No murmur, thrill, or heave            Assessment/Plan:     1. Cervical radiculopathy    - hydrocodone/APAP 5/325 mg (NORCO) 5-325 Tab; Take 1 Tab by mouth 2 times a day as needed for up to 30 days.  Dispense: 60 Tab; Refill: 0    2. Thoracic degenerative disc disease    - hydrocodone/APAP 5/325 mg (NORCO) 5-325 Tab; Take 1 Tab by mouth 2 times a day as needed for up to 30 days.  Dispense: 60 Tab; Refill: 0    3. Chronic foot pain, left    - hydrocodone/APAP 5/325 mg (NORCO) 5-325 Tab; Take 1 Tab by mouth 2 times a day as needed for up to 30 days.  Dispense: 60 Tab; Refill: 0    Plan: 1. Renew Norco 5/325, #60  2. Revisit with me in one month

## 2018-06-06 ENCOUNTER — HOSPITAL ENCOUNTER (OUTPATIENT)
Dept: RADIOLOGY | Facility: MEDICAL CENTER | Age: 63
End: 2018-06-06
Attending: OBSTETRICS & GYNECOLOGY
Payer: MEDICAID

## 2018-06-06 DIAGNOSIS — N64.52 NIPPLE DISCHARGE: ICD-10-CM

## 2018-06-06 PROCEDURE — G0279 TOMOSYNTHESIS, MAMMO: HCPCS

## 2018-06-06 PROCEDURE — 76642 ULTRASOUND BREAST LIMITED: CPT | Mod: RT

## 2018-06-07 NOTE — H&P
DATE OF SCHEDULED SURGERY:  2018    HISTORY OF PRESENT ILLNESS:  Patient is a 62-year-old  2, para 2 with 2   normal vaginal deliveries who is postmenopausal, who came with a history of   uterovaginal prolapse, presented to the emergency room, the symptom has been   there for more than 6 months and has been gradually worsening, has been   menopausal since the age of 50.  Denies any postmenopausal bleeding, is not   sexually active.  Regular bowel movements.  Admits to episodes of incontinence   on sneezing and coughing, but not recurrent.    PAST MEDICAL HISTORY:  The patient is on baclofen for fibromyalgia and also   pain meds.    ALLERGIES:  SHE IS ALLERGIC TO ERYTHROMYCIN AND PENICILLIN.    SOCIAL HISTORY:  Denies smoking or alcohol use.    OBSTETRIC HISTORY:   8, para 2-0-4-2.    PAST SURGICAL HISTORY:  She had a tubal ligation and ankle and foot surgery.    FAMILY HISTORY:  Significant for breast cancer in mother, paternal grandmother   was also diabetic.    PHYSICAL EXAMINATION:  VITAL SIGNS:  Patient is 5 feet 4 inches tall and weighs 163 pounds.  Vital   signs stable.  LUNGS:  Clear to auscultate bilaterally.  No added sounds.  HEART:  Both heart sounds, S1 and S2 are heard and normal.  Normal rate and   rhythm.  No murmurs, gallops or rubs.  ABDOMEN:  Soft and nontender.  No guarding or rigidity.  Bowel sounds are   normal.  No hepatosplenomegaly.  No palpable masses.  No costovertebral angle   tenderness.  PELVIC:  External genitalia with gaping with mild protruding on straining.    The cervix is more pink without discharge or cervical motion tenderness.    Cervical descent, grade III noted.  Vaginal mucosa is pink.  There is a   cystocele grade III, 1 cm beyond the introitus and a rectocele grade II.    Uterus is atrophic, anteverted, mobile.  No adnexal masses noted.    IMPRESSION:  This is a 62-year-old  2, para 2 with uterovaginal   prolapse, grade III cervical descent, grade  III cystocele and rectocele grade   II.  Patient is scheduled for vaginal hysterectomy with possible bilateral   salpingo-oophorectomy with possible colposuspension anterior and posterior,   colpoperineorrhaphy.    Preoperative instructions were given.  Operative procedure was explained in   detail.  The risk inclusive of but not limited to infection, injury, bleeding   and anesthesia has been discussed.  Postoperative recovery was explained.  The   patient understands and has been instructed for use of Premarin.  Patient has   been continuing use of Premarin and also will be continuing postoperatively.    All questions answered to her satisfaction.       ____________________________________     MD ДМИТРИЙ Franco / XOCHILT    DD:  06/06/2018 23:21:57  DT:  06/07/2018 00:09:54    D#:  [id number]  Job#:  [id number]

## 2018-06-13 DIAGNOSIS — Z01.812 PRE-OPERATIVE LABORATORY EXAMINATION: ICD-10-CM

## 2018-06-13 DIAGNOSIS — Z01.810 PRE-OPERATIVE CARDIOVASCULAR EXAMINATION: ICD-10-CM

## 2018-06-13 LAB
ANION GAP SERPL CALC-SCNC: 6 MMOL/L (ref 0–11.9)
APPEARANCE UR: CLEAR
BASOPHILS # BLD AUTO: 0.9 % (ref 0–1.8)
BASOPHILS # BLD: 0.06 K/UL (ref 0–0.12)
BILIRUB UR QL STRIP.AUTO: NEGATIVE
BUN SERPL-MCNC: 19 MG/DL (ref 8–22)
CALCIUM SERPL-MCNC: 10 MG/DL (ref 8.5–10.5)
CHLORIDE SERPL-SCNC: 108 MMOL/L (ref 96–112)
CO2 SERPL-SCNC: 26 MMOL/L (ref 20–33)
COLOR UR: YELLOW
CREAT SERPL-MCNC: 0.98 MG/DL (ref 0.5–1.4)
EKG IMPRESSION: NORMAL
EOSINOPHIL # BLD AUTO: 0.08 K/UL (ref 0–0.51)
EOSINOPHIL NFR BLD: 1.2 % (ref 0–6.9)
ERYTHROCYTE [DISTWIDTH] IN BLOOD BY AUTOMATED COUNT: 44.3 FL (ref 35.9–50)
GLUCOSE SERPL-MCNC: 102 MG/DL (ref 65–99)
GLUCOSE UR STRIP.AUTO-MCNC: NEGATIVE MG/DL
HCT VFR BLD AUTO: 41.8 % (ref 37–47)
HGB BLD-MCNC: 13.6 G/DL (ref 12–16)
IMM GRANULOCYTES # BLD AUTO: 0.02 K/UL (ref 0–0.11)
IMM GRANULOCYTES NFR BLD AUTO: 0.3 % (ref 0–0.9)
KETONES UR STRIP.AUTO-MCNC: NEGATIVE MG/DL
LEUKOCYTE ESTERASE UR QL STRIP.AUTO: NEGATIVE
LYMPHOCYTES # BLD AUTO: 2.33 K/UL (ref 1–4.8)
LYMPHOCYTES NFR BLD: 34.6 % (ref 22–41)
MCH RBC QN AUTO: 30.6 PG (ref 27–33)
MCHC RBC AUTO-ENTMCNC: 32.5 G/DL (ref 33.6–35)
MCV RBC AUTO: 93.9 FL (ref 81.4–97.8)
MICRO URNS: NORMAL
MONOCYTES # BLD AUTO: 0.4 K/UL (ref 0–0.85)
MONOCYTES NFR BLD AUTO: 5.9 % (ref 0–13.4)
NEUTROPHILS # BLD AUTO: 3.84 K/UL (ref 2–7.15)
NEUTROPHILS NFR BLD: 57.1 % (ref 44–72)
NITRITE UR QL STRIP.AUTO: NEGATIVE
NRBC # BLD AUTO: 0 K/UL
NRBC BLD-RTO: 0 /100 WBC
PH UR STRIP.AUTO: 6 [PH]
PLATELET # BLD AUTO: 256 K/UL (ref 164–446)
PMV BLD AUTO: 11.2 FL (ref 9–12.9)
POTASSIUM SERPL-SCNC: 4.2 MMOL/L (ref 3.6–5.5)
PROT UR QL STRIP: NEGATIVE MG/DL
RBC # BLD AUTO: 4.45 M/UL (ref 4.2–5.4)
RBC UR QL AUTO: NEGATIVE
SODIUM SERPL-SCNC: 140 MMOL/L (ref 135–145)
SP GR UR STRIP.AUTO: 1.03
UROBILINOGEN UR STRIP.AUTO-MCNC: 0.2 MG/DL
WBC # BLD AUTO: 6.7 K/UL (ref 4.8–10.8)

## 2018-06-13 PROCEDURE — 80048 BASIC METABOLIC PNL TOTAL CA: CPT

## 2018-06-13 PROCEDURE — 36415 COLL VENOUS BLD VENIPUNCTURE: CPT

## 2018-06-13 PROCEDURE — 85025 COMPLETE CBC W/AUTO DIFF WBC: CPT

## 2018-06-13 PROCEDURE — 93010 ELECTROCARDIOGRAM REPORT: CPT | Performed by: INTERNAL MEDICINE

## 2018-06-13 PROCEDURE — 93005 ELECTROCARDIOGRAM TRACING: CPT

## 2018-06-13 PROCEDURE — 81003 URINALYSIS AUTO W/O SCOPE: CPT

## 2018-06-19 ENCOUNTER — OFFICE VISIT (OUTPATIENT)
Dept: MEDICAL GROUP | Facility: MEDICAL CENTER | Age: 63
End: 2018-06-19
Attending: FAMILY MEDICINE
Payer: MEDICAID

## 2018-06-19 VITALS
OXYGEN SATURATION: 97 % | HEART RATE: 80 BPM | BODY MASS INDEX: 26.63 KG/M2 | HEIGHT: 64 IN | RESPIRATION RATE: 16 BRPM | SYSTOLIC BLOOD PRESSURE: 128 MMHG | DIASTOLIC BLOOD PRESSURE: 66 MMHG | TEMPERATURE: 97.5 F | WEIGHT: 156 LBS

## 2018-06-19 DIAGNOSIS — M79.672 CHRONIC FOOT PAIN, LEFT: ICD-10-CM

## 2018-06-19 DIAGNOSIS — M51.34 THORACIC DEGENERATIVE DISC DISEASE: ICD-10-CM

## 2018-06-19 DIAGNOSIS — G89.29 CHRONIC FOOT PAIN, LEFT: ICD-10-CM

## 2018-06-19 DIAGNOSIS — M54.12 CERVICAL RADICULOPATHY: ICD-10-CM

## 2018-06-19 PROCEDURE — 99213 OFFICE O/P EST LOW 20 MIN: CPT | Performed by: FAMILY MEDICINE

## 2018-06-19 ASSESSMENT — PAIN SCALES - GENERAL: PAINLEVEL: 8=MODERATE-SEVERE PAIN

## 2018-06-19 NOTE — PROGRESS NOTES
"Subjective:      Honey Conde is a 62 y.o. female who presents with No chief complaint on file.            HPI 1. Cervical radiculopathy/chronic neck pain-patient reports she has been managing pain appropriately using the Norco 5/325 twice daily. She is scheduled in 2 days for her hysterectomy and bladder suspension surgery to resolve a grade 2 cystocele. The surgeon will provide a separate narcotic prescription for the first postoperative week during which time she'll suspend our prescription of [place]. Next monthly prescription of Norco will be due for submission on approximately 6/28/18.    ROS no recent constipation, confusion, nausea       Objective:     /66   Pulse 80   Temp 36.4 °C (97.5 °F)   Resp 16   Ht 1.626 m (5' 4.02\")   Wt 70.8 kg (156 lb)   LMP 03/10/2004   SpO2 97%   BMI 26.76 kg/m²      Physical Exam   Gen.- alert, cooperative, in no acute distress  Neck- midline trachea, thyroid not enlarged or tender,supple, no cervical adenopathy  Chest-clear to auscultation and percussion with normal breath sounds. No retractions. Chest wall nontender  Cardiac- regular rhythm and rate. No murmur, thrill, or heave            Assessment/Plan:     1. Cervical radiculopathy    - hydrocodone/APAP 5/325 mg (NORCO) 5-325 Tab; Take 1 Tab by mouth 2 times a day as needed for up to 30 days.  Dispense: 60 Tab; Refill: 0    2. Thoracic degenerative disc disease    - hydrocodone/APAP 5/325 mg (NORCO) 5-325 Tab; Take 1 Tab by mouth 2 times a day as needed for up to 30 days.  Dispense: 60 Tab; Refill: 0    3. Chronic foot pain, left    - hydrocodone/APAP 5/325 mg (NORCO) 5-325 Tab; Take 1 Tab by mouth 2 times a day as needed for up to 30 days.  Dispense: 60 Tab; Refill: 0    Plan: 1. Norco 5/325, #60 written for submission 6/28/18  2. Follow-up with me by late July or sooner if needed  "

## 2018-06-21 ENCOUNTER — HOSPITAL ENCOUNTER (OUTPATIENT)
Facility: MEDICAL CENTER | Age: 63
End: 2018-06-22
Attending: OBSTETRICS & GYNECOLOGY | Admitting: OBSTETRICS & GYNECOLOGY
Payer: MEDICAID

## 2018-06-21 ENCOUNTER — APPOINTMENT (OUTPATIENT)
Dept: RADIOLOGY | Facility: MEDICAL CENTER | Age: 63
End: 2018-06-21
Attending: OBSTETRICS & GYNECOLOGY
Payer: MEDICAID

## 2018-06-21 PROCEDURE — 501411 HCHG SPONGE, BABY LAP W/O RINGS: Performed by: OBSTETRICS & GYNECOLOGY

## 2018-06-21 PROCEDURE — 88307 TISSUE EXAM BY PATHOLOGIST: CPT

## 2018-06-21 PROCEDURE — 700102 HCHG RX REV CODE 250 W/ 637 OVERRIDE(OP): Performed by: OBSTETRICS & GYNECOLOGY

## 2018-06-21 PROCEDURE — 501838 HCHG SUTURE GENERAL: Performed by: OBSTETRICS & GYNECOLOGY

## 2018-06-21 PROCEDURE — 160029 HCHG SURGERY MINUTES - 1ST 30 MINS LEVEL 4: Performed by: OBSTETRICS & GYNECOLOGY

## 2018-06-21 PROCEDURE — 72170 X-RAY EXAM OF PELVIS: CPT

## 2018-06-21 PROCEDURE — A4338 INDWELLING CATHETER LATEX: HCPCS | Performed by: OBSTETRICS & GYNECOLOGY

## 2018-06-21 PROCEDURE — 160009 HCHG ANES TIME/MIN: Performed by: OBSTETRICS & GYNECOLOGY

## 2018-06-21 PROCEDURE — 500892 HCHG PACK, PERI-GYN: Performed by: OBSTETRICS & GYNECOLOGY

## 2018-06-21 PROCEDURE — 160036 HCHG PACU - EA ADDL 30 MINS PHASE I: Performed by: OBSTETRICS & GYNECOLOGY

## 2018-06-21 PROCEDURE — 110454 HCHG SHELL REV 250: Performed by: OBSTETRICS & GYNECOLOGY

## 2018-06-21 PROCEDURE — G0378 HOSPITAL OBSERVATION PER HR: HCPCS

## 2018-06-21 PROCEDURE — 160035 HCHG PACU - 1ST 60 MINS PHASE I: Performed by: OBSTETRICS & GYNECOLOGY

## 2018-06-21 PROCEDURE — 500901 HCHG PACKING, VAG 2 X-RAY: Performed by: OBSTETRICS & GYNECOLOGY

## 2018-06-21 PROCEDURE — 700101 HCHG RX REV CODE 250

## 2018-06-21 PROCEDURE — 160041 HCHG SURGERY MINUTES - EA ADDL 1 MIN LEVEL 4: Performed by: OBSTETRICS & GYNECOLOGY

## 2018-06-21 PROCEDURE — 160002 HCHG RECOVERY MINUTES (STAT): Performed by: OBSTETRICS & GYNECOLOGY

## 2018-06-21 PROCEDURE — 700111 HCHG RX REV CODE 636 W/ 250 OVERRIDE (IP)

## 2018-06-21 PROCEDURE — A9270 NON-COVERED ITEM OR SERVICE: HCPCS | Performed by: OBSTETRICS & GYNECOLOGY

## 2018-06-21 PROCEDURE — 501330 HCHG SET, CYSTO IRRIG TUBING: Performed by: OBSTETRICS & GYNECOLOGY

## 2018-06-21 PROCEDURE — 160048 HCHG OR STATISTICAL LEVEL 1-5: Performed by: OBSTETRICS & GYNECOLOGY

## 2018-06-21 PROCEDURE — A9270 NON-COVERED ITEM OR SERVICE: HCPCS

## 2018-06-21 PROCEDURE — 700102 HCHG RX REV CODE 250 W/ 637 OVERRIDE(OP)

## 2018-06-21 RX ORDER — HYDROCODONE BITARTRATE AND ACETAMINOPHEN 5; 325 MG/1; MG/1
1-2 TABLET ORAL EVERY 6 HOURS PRN
Status: DISCONTINUED | OUTPATIENT
Start: 2018-06-21 | End: 2018-06-22 | Stop reason: HOSPADM

## 2018-06-21 RX ORDER — SODIUM CHLORIDE, SODIUM LACTATE, POTASSIUM CHLORIDE, CALCIUM CHLORIDE 600; 310; 30; 20 MG/100ML; MG/100ML; MG/100ML; MG/100ML
INJECTION, SOLUTION INTRAVENOUS CONTINUOUS
Status: DISCONTINUED | OUTPATIENT
Start: 2018-06-21 | End: 2018-06-22 | Stop reason: HOSPADM

## 2018-06-21 RX ORDER — OXYCODONE HCL 5 MG/5 ML
SOLUTION, ORAL ORAL
Status: COMPLETED
Start: 2018-06-21 | End: 2018-06-21

## 2018-06-21 RX ORDER — BUPIVACAINE HYDROCHLORIDE 2.5 MG/ML
INJECTION, SOLUTION EPIDURAL; INFILTRATION; INTRACAUDAL
Status: COMPLETED
Start: 2018-06-21 | End: 2018-06-21

## 2018-06-21 RX ORDER — ACETAMINOPHEN 325 MG/1
650 TABLET ORAL EVERY 6 HOURS
Status: DISCONTINUED | OUTPATIENT
Start: 2018-06-21 | End: 2018-06-22 | Stop reason: HOSPADM

## 2018-06-21 RX ORDER — IBUPROFEN 800 MG/1
800 TABLET ORAL
Status: DISCONTINUED | OUTPATIENT
Start: 2018-06-21 | End: 2018-06-22 | Stop reason: HOSPADM

## 2018-06-21 RX ORDER — BUPIVACAINE HYDROCHLORIDE 5 MG/ML
INJECTION, SOLUTION EPIDURAL; INTRACAUDAL
Status: COMPLETED
Start: 2018-06-21 | End: 2018-06-21

## 2018-06-21 RX ORDER — BACITRACIN ZINC, NEOMYCIN SULFATE, AND POLYMYXIN B SULFATE 400; 3.5; 5 [IU]/G; MG/G; [IU]/G
OINTMENT TOPICAL
Status: DISCONTINUED | OUTPATIENT
Start: 2018-06-21 | End: 2018-06-21 | Stop reason: HOSPADM

## 2018-06-21 RX ORDER — BUPIVACAINE HYDROCHLORIDE AND EPINEPHRINE 2.5; 5 MG/ML; UG/ML
INJECTION, SOLUTION INFILTRATION; PERINEURAL
Status: DISCONTINUED | OUTPATIENT
Start: 2018-06-21 | End: 2018-06-21 | Stop reason: HOSPADM

## 2018-06-21 RX ORDER — ONDANSETRON 2 MG/ML
4 INJECTION INTRAMUSCULAR; INTRAVENOUS EVERY 6 HOURS PRN
Status: DISCONTINUED | OUTPATIENT
Start: 2018-06-21 | End: 2018-06-22 | Stop reason: HOSPADM

## 2018-06-21 RX ORDER — TIMOLOL MALEATE 5 MG/ML
SOLUTION/ DROPS OPHTHALMIC
Status: COMPLETED
Start: 2018-06-21 | End: 2018-06-21

## 2018-06-21 RX ADMIN — HYDROCODONE BITARTRATE AND ACETAMINOPHEN 1 TABLET: 5; 325 TABLET ORAL at 23:50

## 2018-06-21 RX ADMIN — SODIUM CHLORIDE, SODIUM LACTATE, POTASSIUM CHLORIDE, CALCIUM CHLORIDE: 600; 310; 30; 20 INJECTION, SOLUTION INTRAVENOUS at 09:25

## 2018-06-21 RX ADMIN — OXYCODONE HYDROCHLORIDE 10 MG: 5 SOLUTION ORAL at 13:44

## 2018-06-21 RX ADMIN — HYDROCODONE BITARTRATE AND ACETAMINOPHEN 1 TABLET: 5; 325 TABLET ORAL at 17:41

## 2018-06-21 RX ADMIN — IBUPROFEN 800 MG: 800 TABLET, FILM COATED ORAL at 17:41

## 2018-06-21 RX ADMIN — ACETAMINOPHEN 650 MG: 325 TABLET, FILM COATED ORAL at 23:48

## 2018-06-21 ASSESSMENT — PATIENT HEALTH QUESTIONNAIRE - PHQ9
1. LITTLE INTEREST OR PLEASURE IN DOING THINGS: NOT AT ALL
2. FEELING DOWN, DEPRESSED, IRRITABLE, OR HOPELESS: NOT AT ALL
SUM OF ALL RESPONSES TO PHQ9 QUESTIONS 1 AND 2: 0

## 2018-06-21 ASSESSMENT — PAIN SCALES - GENERAL
PAINLEVEL_OUTOF10: 6
PAINLEVEL_OUTOF10: 2
PAINLEVEL_OUTOF10: 8
PAINLEVEL_OUTOF10: 4
PAINLEVEL_OUTOF10: 0
PAINLEVEL_OUTOF10: 8
PAINLEVEL_OUTOF10: 4
PAINLEVEL_OUTOF10: 6

## 2018-06-21 ASSESSMENT — LIFESTYLE VARIABLES
EVER_SMOKED: YES
ALCOHOL_USE: NO

## 2018-06-21 NOTE — OR NURSING
1313 Patient arrived from OR on [facility]. Report received from anesthesia and RN. Oral airway in place. Will continue to monitor.     1315 oral airway out    1344 Patient awake, complaining of abdominal pain, oxycodone given as ordered    1400 Patient rated pain as 8/10, appears calm, declining pain medicine at the moment    1422 Patient said pain is better and she is ready to go to her room. Went back to sleep after conversation with RN. Report called to BHARGAV Dempsey, patient to go to [facility].    1437 Patient moved to room with transporter, belongings with patient

## 2018-06-21 NOTE — PROGRESS NOTES
1500: Pt arrived to unit from PACU via pt transport.  AAOx4.  Able to ambulate to bed.  C/o pain at 8/10, declining intervention at this time.  Pt reporting chronic pain.  In process of getting home regimen ordered.   Vaginal packing in place, orders to remove at 0600 POD1.  Briggs in place draining clear, yellow urine.  Regular diet ordered for pt, no c/o n/v.  POC reviewed with pt.  Call light within reach, pt educated to call for assistance.  Hourly rounding in place.

## 2018-06-21 NOTE — OR SURGEON
Immediate Post OP Note    PreOp Diagnosis: uterovaginal prolapse   cystoceole rectocele.    PostOp Diagnosis: same.    Procedure(s):  VAGINAL HYSTERECTOMY TOTAL - Wound Class: Clean Contaminated  ANTERIOR AND POSTERIOR REPAIR - FOR ANTERIOR COLPORRAPHY AND POSTERIOR COLPO PERINEORRAPHY - Wound Class: Clean Contaminated  VAGINAL SUSPENSION - POSS UTEROSACRAL VAULT - Wound Class: Clean Contaminated.   cystoscopy clean contiminated.    Surgeon(s):  RAMYA Franco M.D.    Anesthesiologist/Type of Anesthesia:  Anesthesiologist: Dave Aguilar D.O./General    Surgical Staff:  Circulator: Candice Young R.N.  Relief Scrub: Marisol Everett  Scrub Person: Valerie Arceo    Specimens removed if any:  * No specimens in log *    Estimated Blood Loss: 200ml    Findings: normal size uterus both tubes and ovaries normal.  Complications:  None.        6/21/2018 1:11 PM Von Reagan M.D.

## 2018-06-21 NOTE — CARE PLAN
Problem: Safety  Goal: Will remain free from injury  Outcome: PROGRESSING AS EXPECTED  Educated on use of call light and not to get out of bed without staff present    Problem: Pain Management  Goal: Pain level will decrease to patient's comfort goal  Outcome: PROGRESSING AS EXPECTED  Patient with chronic pain, will obtain orders for home meds from MD

## 2018-06-22 VITALS
DIASTOLIC BLOOD PRESSURE: 73 MMHG | SYSTOLIC BLOOD PRESSURE: 134 MMHG | WEIGHT: 157.19 LBS | RESPIRATION RATE: 20 BRPM | HEART RATE: 61 BPM | TEMPERATURE: 98.3 F | BODY MASS INDEX: 26.84 KG/M2 | OXYGEN SATURATION: 96 % | HEIGHT: 64 IN

## 2018-06-22 PROCEDURE — A9270 NON-COVERED ITEM OR SERVICE: HCPCS | Performed by: OBSTETRICS & GYNECOLOGY

## 2018-06-22 PROCEDURE — 51798 US URINE CAPACITY MEASURE: CPT

## 2018-06-22 PROCEDURE — 700102 HCHG RX REV CODE 250 W/ 637 OVERRIDE(OP): Performed by: OBSTETRICS & GYNECOLOGY

## 2018-06-22 PROCEDURE — G0378 HOSPITAL OBSERVATION PER HR: HCPCS

## 2018-06-22 RX ADMIN — ACETAMINOPHEN 650 MG: 325 TABLET, FILM COATED ORAL at 06:09

## 2018-06-22 RX ADMIN — IBUPROFEN 800 MG: 800 TABLET, FILM COATED ORAL at 07:47

## 2018-06-22 RX ADMIN — ACETAMINOPHEN 650 MG: 325 TABLET, FILM COATED ORAL at 11:27

## 2018-06-22 RX ADMIN — IBUPROFEN 800 MG: 800 TABLET, FILM COATED ORAL at 11:27

## 2018-06-22 RX ADMIN — HYDROCODONE BITARTRATE AND ACETAMINOPHEN 1 TABLET: 5; 325 TABLET ORAL at 06:08

## 2018-06-22 ASSESSMENT — PAIN SCALES - GENERAL
PAINLEVEL_OUTOF10: 2
PAINLEVEL_OUTOF10: 6
PAINLEVEL_OUTOF10: 2
PAINLEVEL_OUTOF10: 6
PAINLEVEL_OUTOF10: 5
PAINLEVEL_OUTOF10: 6

## 2018-06-22 NOTE — CARE PLAN
Problem: Safety  Goal: Will remain free from injury  Outcome: PROGRESSING AS EXPECTED  Educated on use of call light    Problem: Pain Management  Goal: Pain level will decrease to patient's comfort goal  Outcome: PROGRESSING AS EXPECTED  Will medicate per MAR prn

## 2018-06-22 NOTE — PROGRESS NOTES
Pt laying in bed, fall education reinforced, call light within reach, bed lowered and locked. Pt lung sounds are clear in all lobes, pt heart sounds are within defined limits, pt bowel sounds are normoactive x4. Pt is stand-by assist and understands to use the call light before exiting the bed. Pt understands plan of care, vaginal packing is to be removed in the AM and possible discharge tomorrow if no complications. Pt IV is clean,dry,intact,and patent. Pt is A&Ox4 and on 1L of oxygen with saturation of 95%. Pt encouraged to use IS QHour while awake.

## 2018-06-22 NOTE — CARE PLAN
Problem: Discharge Barriers/Planning  Goal: Patient's continuum of care needs will be met  Outcome: PROGRESSING AS EXPECTED  Pt educated on plan of care and parameters for discharge. Pt understands that oxygen requirements will decrease with mobility and use of incentive spirometry.     Problem: Fluid Volume:  Goal: Will maintain balanced intake and output  Outcome: PROGRESSING AS EXPECTED  Patient intakes adequate amounts of PO fluids and is running IV fluids TKO.

## 2018-06-22 NOTE — OP REPORT
DATE OF SERVICE:  06/21/2018    PREOPERATIVE DIAGNOSES:  Uterine prolapse, cystocele, rectocele.    POSTOPERATIVE DIAGNOSES:  Uterine prolapse, cystocele, rectocele.    OPERATIONS PERFORMED:  Vaginal hysterectomy with anterior and posterior   colporrhaphy with uterosacral vault suspension, with cystoscopy.    SURGEON:  Von Reagan MD    ASSISTANT:  Nicki Flores MD    ANESTHESIA:  General endotracheal.    ANESTHESIOLOGIST:  Dr. Dave Aguilar.    FLUIDS:  Ringer's lactate, 1500 mL.    Counts were incorrect.  There was a missing needle count associated with wrong   number counted at the beginning, but x-rays intraoperatively was done and no   needle or foreign body was noticed prior to closure.    COMPLICATIONS:  None.    ESTIMATED BLOOD LOSS:  200 mL    PROCEDURE PERFORMED:  As discussed above.    FINDINGS:  Normal functioning ureters bilaterally, normal bladder and normal   urethral lumen, uterus was of normal size.  Both tubes and ovaries were normal   bilaterally.    DESCRIPTION OF THE PROCEDURE:  The patient has been counseled extensively and   consented for the above procedure, was taken to the operating room and general   anesthesia was induced without difficulty.  Patient was positioned in dorsal   lithotomy position.  Bladder was Briggs catheterized.  A heavy weighted   speculum was placed and up retracting the posterior vaginal wall.  Anterior   lip of the cervix was grasped with a tenaculum and the cervix was infiltrated   all around using 0.25% Marcaine with epinephrine.  The circumferential   incision was then made about the cervix and the anterior posterior vaginal   mucosa through whole thickness of the vaginal wall.  Then with finger   dissection, this was  out and the bulging cul-de-sac was visualized   which was held with hemostat and sharp nick was made with the Metzenbaum and   the entry was confirmed by clear fluid.  A Duckbill speculum was introduced   through the culdotomy and  placed in position.  Then attention was directed   towards the anterior culdotomy, dissection was carried out, bladder pillar was   clamped, cut, and ligated.  Then, the bladder reflection was further created   and identified a loose peritoneal fold, which was held with Allis and entry   into the peritoneal cavity was confirmed with clear fluid.  Incision was    by opening up the scissors and the right angle retractor was   introduced into the pelvic cavity.  Using a So's clamp, the uterosacral   and cardinal ligament was clamped, cut, and suture ligated and held in place.    Then, the dissection was carried out and uterine vessels were skeletonized   and the uterine vessels were clamped, cut, and free ligation was doubly   ligated.  Then, the round ligament and the fallopian tube were clamped and   undermined from both the ends of the ___ both the sides, cut, and doubly   ligated with free ties and then suture ligated with transfixing sutures on   both the sides.  The uterus and cervix was released and sent for pathology.    The vaults were held up and by using a mini lap introduced into the pelvic   cavity and vaginal cuff was visualized.  Hemostatic figure-of-8 stitches was   placed at the left angle of the vaginal cuff and hemostasis was attained.    Then, using OPDS uterosacral bites were taken on both the sides by skimmed in   the midline over the peritoneum of the colon and brought down to the other   side and bites were taken on the opposite uterosacral ligament and was tied   down to obliterate the cul-de sac.  Clear obliteration was noted.  Then, a 0   Vicryl stitch was brought out through the full thickness of the vaginal wall   and stitch was taken on the uterosacral ligament, skimmed over to the midline   cul-de sac and same suture was taken on to the opposite uterosacral ligament   and brought down through and another stitch was started from the other angle   and through the whole  thickness of the vaginal wall and uterosacral ligament   was taken and brought out through the vaginal wall and tied on both angles of   the suture.  These sutures were run anteroposteriorly in a running fashion and   the vaginal vault was closed in 2 layers.  Hemostasis was obtained.  Then,   x-ray was done prior to closure of the vaginal vault ensuring no foreign body   was left behind.  Then, attention was directed towards anterior colporrhaphy,   Allis clamp was closed at the vaginal vault and another Allis clamp was placed   2 cm below the urethral opening at the bladder base of reflection.  A   vertical incision was made on the vaginal mucosa through the thickness.  This   was held up using a [place] and hooks and dissection was carried out by   sharp, blunt and finger dissection to separate the underlying fascia from the   vaginal wall.  Then bladder buttressing was done with the sutures bites taken   down on the vesicovaginal fascia in a series of pursestring fashion and the   bladder base was well supported.  Excess of vaginal wall was excised and the   anterior colporrhaphy was closed in a running fashion in a vertical direction.    Hemostasis was reaffirmed.  Then an attention was directed towards the   posterior colpoperineorrhaphy.  Triangular Allis was placed at the lateral   edge of the posterior fourchette and the midline Allis was placed at the apex   of the rectocele.  A jose manuel shaped incision was made between the 2 Allises on   the introitus and the mucosa and the skin was excised.  The posterior vaginal   wall was lifted up and a plane was created just underneath the vaginal wall   and using scissors this plane was further created along the vaginal wall to   the apex of the vagina  the underlying rectum from the vaginal wall.    Using sharp and blunt dissection, the rectum was  away from the   vaginal wall and pushed back.  A couple of buttressing sutures was used to   bring  the torn rectovaginal fascia and using pursestring string sutures and    the rectum.  Excess of the vaginal wall was excised and the   posterior vaginal wall was closed using 2-0 Vicryl in a running fashion and   the perineum was closed with 2-0 Vicryl in 3 layers.  Hemostasis was affirmed.    Rectal examination revealed no sutures.  After, re-gloving attention was   directed towards the cystoscopy.  Bladder slowly was deflated and removed.    Cystoscope was introduced.  Bladder was inflated with Ringer's lactate.  All   the walls of the bladder wall were visualized.  Bilaterally ureteric jet flow   was visualized.  The cystoscope was withdrawn and the bladder Briggs was   repositioned.  The vagina was packed with a vaginal pack soaked in Neosporin   and Premarin.  The patient was extubated and transferred to the recovery room   under stable condition.       ____________________________________     MD ДМИТРИЙ Franco / XOCHILT    DD:  06/21/2018 13:21:56  DT:  06/21/2018 15:21:48    D#:  [id number]  Job#:  [id number]

## 2018-06-22 NOTE — PROGRESS NOTES
Pt packing removed from vagina. Dressing intact and discarded with no fresh blood present. Pt lomeli removed and pt educated on when she is due to void.

## 2018-06-22 NOTE — PROGRESS NOTES
Discharge instructions reviewed with pt.  Son present at bedside.  All questions answered.  PIV removed.  Pt escorted off unit to discharge home with son.

## 2018-06-22 NOTE — DISCHARGE INSTRUCTIONS
Discharge Instructions    Discharged to home by car with relative. Discharged via wheelchair, hospital escort: Yes.  Special equipment needed: Not Applicable    Be sure to schedule a follow-up appointment with your primary care doctor or any specialists as instructed.     Discharge Plan:   Diet Plan: (P) Discussed  Activity Level: (P) Discussed  Smoking Cessation Offered: Patient Refused  Confirmed Follow up Appointment: (P) Patient to Call and Schedule Appointment  Confirmed Symptoms Management: (P) Discussed  Medication Reconciliation Updated: (P) Yes  Influenza Vaccine Indication: Patient Refuses    I understand that a diet low in cholesterol, fat, and sodium is recommended for good health. Unless I have been given specific instructions below for another diet, I accept this instruction as my diet prescription.   Other diet: Regular diet.     Special Instructions: None    · Is patient discharged on Warfarin / Coumadin?   No       Vaginal Hysterectomy  Introduction  A vaginal hysterectomy is a procedure to remove all or part of the uterus through a small incision in the vagina. In this procedure, your health care provider may remove your entire uterus, including the lower end (cervix).  You may need a vaginal hysterectomy to treat:  · Uterine fibroids.  · A condition that causes the lining of the uterus to grow in other areas (endometriosis).  · Problems with pelvic support.  · Cancer of the cervix, ovaries, uterus, or tissue that lines the uterus (endometrium).  · Excessive (dysfunctional) uterine bleeding.  When removing your uterus, your health care provider may also remove the organs that produce eggs (ovaries) and the tubes that carry eggs to your uterus (fallopian tubes). After a vaginal hysterectomy, you will no longer be able to have a baby. You will also no longer get your menstrual period.  Tell a health care provider about:  · Any allergies you have.  · All medicines you are taking, including vitamins,  herbs, eye drops, creams, and over-the-counter medicines.  · Any problems you or family members have had with anesthetic medicines.  · Any blood disorders you have.  · Any surgeries you have had.  · Any medical conditions you have.  · Whether you are pregnant or may be pregnant.  What are the risks?  Generally, this is a safe procedure. However, problems may occur, including:  · Bleeding.  · Infection.  · A blood clot that forms in your leg and travels to your lungs (pulmonary embolism).  · Damage to surrounding organs.  · Pain during sex.  What happens before the procedure?  · Ask your health care provider what organs will be removed during surgery.  · Ask your health care provider about:  ¨ Changing or stopping your regular medicines. This is especially important if you are taking diabetes medicines or blood thinners.  ¨ Taking medicines such as aspirin and ibuprofen. These medicines can thin your blood. Do not take these medicines before your procedure if your health care provider instructs you not to.  · Follow instructions from your health care provider about eating or drinking restrictions.  · Do not use any tobacco products, such as cigarettes, chewing tobacco, and e-cigarettes. If you need help quitting, ask your health care provider.  · Plan to have someone take you home after discharge from the hospital.  What happens during the procedure?  · To reduce your risk of infection:  ¨ Your health care team will wash or sanitize their hands.  ¨ Your skin will be washed with soap.  · An IV tube will be inserted into one of your veins.  · You may be given antibiotic medicine to help prevent infection.  · You will be given one or more of the following:  ¨ A medicine to help you relax (sedative).  ¨ A medicine to numb the area (local anesthetic).  ¨ A medicine to make you fall asleep (general anesthetic).  ¨ A medicine that is injected into an area of your body to numb everything beyond the injection site (regional  anesthetic).  · Your surgeon will make an incision in your vagina.  · Your surgeon will locate and remove all or part of your uterus.  · Your ovaries and fallopian tubes may be removed at the same time.  · The incision will be closed with stitches (sutures) that dissolve over time.  The procedure may vary among health care providers and hospitals.  What happens after the procedure?  · Your blood pressure, heart rate, breathing rate, and blood oxygen level will be monitored often until the medicines you were given have worn off.  · You will be encouraged to get up and walk around after a few hours to help prevent complications.  · You may have IV tubes in place for a few days.  · You will be given pain medicine as needed.  · Do not drive for 24 hours if you were given a sedative.  This information is not intended to replace advice given to you by your health care provider. Make sure you discuss any questions you have with your health care provider.  Document Released: 04/10/2017 Document Revised: 05/25/2017 Document Reviewed: 01/01/2017  © 2017 [employer]      Depression / Suicide Risk    As you are discharged from this [facility] facility, it is important to learn how to keep safe from harming yourself.    Recognize the warning signs:  · Abrupt changes in personality, positive or negative- including increase in energy   · Giving away possessions  · Change in eating patterns- significant weight changes-  positive or negative  · Change in sleeping patterns- unable to sleep or sleeping all the time   · Unwillingness or inability to communicate  · Depression  · Unusual sadness, discouragement and loneliness  · Talk of wanting to die  · Neglect of personal appearance   · Rebelliousness- reckless behavior  · Withdrawal from people/activities they love  · Confusion- inability to concentrate     If you or a loved one observes any of these behaviors or has concerns about self-harm, here's what you can do:  · Talk about it-  your feelings and reasons for harming yourself  · Remove any means that you might use to hurt yourself (examples: pills, rope, extension cords, firearm)  · Get professional help from the community (Mental Health, Substance Abuse, psychological counseling)  · Do not be alone:Call your Safe Contact- someone whom you trust who will be there for you.  · Call your local CRISIS HOTLINE 537-9468 or 855-940-1043  · Call your local Children's Mobile Crisis Response Team [place] (096) 750-2703 or www.[employer]  · Call the toll free National Suicide Prevention Hotlines   · National Suicide Prevention Lifeline 685-503-[facility] (5822)  · National Hope Line Network 800-SUICIDE (391-9917)

## 2018-06-22 NOTE — PROGRESS NOTES
AAOx4.  C/o pain at 6/10, medicated per MAR.   Briggs removed at 0600, awaiting void.   Regular diet in place, no c/o n/v.  POC reviewed with pt.  Call light within reach, pt educated to call for assistance.  Hourly rounding in place.

## 2018-07-23 ENCOUNTER — OFFICE VISIT (OUTPATIENT)
Dept: MEDICAL GROUP | Facility: MEDICAL CENTER | Age: 63
End: 2018-07-23
Attending: FAMILY MEDICINE
Payer: MEDICAID

## 2018-07-23 VITALS
SYSTOLIC BLOOD PRESSURE: 112 MMHG | TEMPERATURE: 98 F | RESPIRATION RATE: 16 BRPM | BODY MASS INDEX: 26.98 KG/M2 | DIASTOLIC BLOOD PRESSURE: 60 MMHG | OXYGEN SATURATION: 94 % | HEART RATE: 80 BPM | HEIGHT: 64 IN | WEIGHT: 158 LBS

## 2018-07-23 DIAGNOSIS — M79.672 CHRONIC FOOT PAIN, LEFT: ICD-10-CM

## 2018-07-23 DIAGNOSIS — G89.29 CHRONIC FOOT PAIN, LEFT: ICD-10-CM

## 2018-07-23 DIAGNOSIS — M51.34 THORACIC DEGENERATIVE DISC DISEASE: ICD-10-CM

## 2018-07-23 DIAGNOSIS — M54.12 CERVICAL RADICULOPATHY: ICD-10-CM

## 2018-07-23 PROCEDURE — 99212 OFFICE O/P EST SF 10 MIN: CPT | Performed by: FAMILY MEDICINE

## 2018-07-23 PROCEDURE — 99213 OFFICE O/P EST LOW 20 MIN: CPT | Performed by: FAMILY MEDICINE

## 2018-07-23 NOTE — PROGRESS NOTES
Subjective:      Honey Conde is a 62 y.o. female who presents with No chief complaint on file.            HPI 1. Chronic pain-patient reports that her gynecologist just had her use her own home supply of [employer]ent 5/325. She reports she took it twice per day. Did not really have any significant prolonged postoperative pain after her recent hysterectomy/bladder suspension. She reports that recovered from that has gone very well  Notes continued soreness in her mid back and also continued soreness her left ankle with previous placement of hardware. 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 Patient reports she returned back to work 3 days ago with only some fatigue being noted.    ROS negative for current constipation, nausea, confusion       Objective:     /60   Pulse 80   Temp 36.7 °C (98 °F)   Resp 16   Wt 71.7 kg (158 lb)   LMP 03/10/2004   SpO2 94%   BMI 27.11 kg/m²      Physical Exam  Gen.- alert, cooperative, in no acute distress  Neck- midline trachea, thyroid not enlarged or tender,supple, no cervical adenopathy  Chest-clear to auscultation and percussion with normal breath sounds. No retractions. Chest wall nontender  Cardiac- regular rhythm and rate. No murmur, thrill, or heave  Back-mildly tender from T4-T10 in the midline. Paraspinous areas are nontender to palpation.     UDS-consistent, 6/4/18     Assessment/Plan:     1. Cervical radiculopathy    - hydrocodone/APAP 5/325 mg (NORCO) 5-325 Tab; Take 1 Tab by mouth 2 times a day as needed for up to 30 days.  Dispense: 60 Tab; Refill: 0    2. Thoracic degenerative disc disease    - hydrocodone/APAP 5/325 mg (NORCO) 5-325 Tab; Take 1 Tab by mouth 2 times a day as needed for up to 30 days.  Dispense: 60 Tab; Refill: 0    3. Chronic foot pain, left  - hydrocodone/APAP 5/325 mg (NORCO) 5-325 Tab; Take 1 Tab by mouth 2 times a day as needed for up to 30 days.  Dispense: 60 Tab; Refill: 0    Plan: 1. Renew  Norco 5/325, #60, for 3 months with revisit in 90 days

## 2018-10-23 ENCOUNTER — OFFICE VISIT (OUTPATIENT)
Dept: MEDICAL GROUP | Facility: MEDICAL CENTER | Age: 63
End: 2018-10-23
Attending: FAMILY MEDICINE
Payer: MEDICAID

## 2018-10-23 VITALS
SYSTOLIC BLOOD PRESSURE: 124 MMHG | HEART RATE: 72 BPM | TEMPERATURE: 97.6 F | BODY MASS INDEX: 26.8 KG/M2 | HEIGHT: 64 IN | RESPIRATION RATE: 16 BRPM | OXYGEN SATURATION: 94 % | WEIGHT: 157 LBS | DIASTOLIC BLOOD PRESSURE: 62 MMHG

## 2018-10-23 DIAGNOSIS — M54.12 CERVICAL RADICULOPATHY: ICD-10-CM

## 2018-10-23 DIAGNOSIS — M51.34 THORACIC DEGENERATIVE DISC DISEASE: ICD-10-CM

## 2018-10-23 PROCEDURE — 99213 OFFICE O/P EST LOW 20 MIN: CPT | Performed by: FAMILY MEDICINE

## 2018-10-23 RX ORDER — HYDROCODONE BITARTRATE AND ACETAMINOPHEN 5; 325 MG/1; MG/1
1 TABLET ORAL EVERY 6 HOURS PRN
Qty: 84 TAB | Refills: 0 | Status: SHIPPED | OUTPATIENT
Start: 2018-12-26 | End: 2019-02-01 | Stop reason: SDUPTHER

## 2018-10-23 RX ORDER — HYDROCODONE BITARTRATE AND ACETAMINOPHEN 5; 325 MG/1; MG/1
1 TABLET ORAL EVERY 6 HOURS PRN
Qty: 84 TAB | Refills: 0 | Status: SHIPPED | OUTPATIENT
Start: 2018-10-31 | End: 2018-10-23 | Stop reason: SDUPTHER

## 2018-10-23 RX ORDER — HYDROCODONE BITARTRATE AND ACETAMINOPHEN 5; 325 MG/1; MG/1
1 TABLET ORAL EVERY 6 HOURS PRN
Qty: 84 TAB | Refills: 0 | Status: SHIPPED | OUTPATIENT
Start: 2018-11-28 | End: 2018-10-23 | Stop reason: SDUPTHER

## 2018-10-23 NOTE — PROGRESS NOTES
"Subjective:      Honey Conde is a 63 y.o. female who presents with No chief complaint on file.            HPI 1.  Chronic neck/thoracic spine pain-patient reports pain is fairly well managed currently using Norco 5/325 twice daily.  Is not reporting any routine low back pain and there is no pain radiating to either lower extremity.  Patient reports most recent dose of Norco was earlier this morning    ROS negative for urinary incontinence, fecal incontinence, unexplained diaphoresis       Objective:     /62 (BP Location: Left arm, Patient Position: Sitting, BP Cuff Size: Adult)   Pulse 72   Temp 36.4 °C (97.6 °F) (Temporal)   Resp 16   Ht 1.626 m (5' 4.02\")   Wt 71.2 kg (157 lb)   LMP 03/10/2004   SpO2 94%   BMI 26.94 kg/m²      Physical Exam  Gen.- alert, cooperative, in no acute distress  Neck- midline trachea, thyroid not enlarged or tender,supple, no cervical adenopathy  Chest-clear to auscultation and percussion with normal breath sounds. No retractions. Chest wall nontender  Cardiac- regular rhythm and rate. No murmur, thrill, or heave  Back-1+ tender in the midline from T4-T6.  Paraspinous areas are nontender skin          Assessment/Plan:     1. Thoracic degenerative disc disease    - HYDROcodone-acetaminophen (NORCO) 5-325 MG Tab per tablet; Take 1 Tab by mouth every 6 hours as needed for up to 28 days.  Dispense: 84 Tab; Refill: 0  - [facility] [facility] SCREEN; Future    2. Cervical radiculopathy  Is coming through the things that movements as bad as a lot of people  - HYDROcodone-acetaminophen (NORCO) 5-325 MG Tab per tablet; Take 1 Tab by mouth every 6 hours as needed for up to 28 days.  Dispense: 84 Tab; Refill: 0  - [facility] SCREEN; Future    Plan: 1.  Norco 5/325, #84, written with 2 additional refills  2.  Update [employer] UDS  3.  Revisit in 3 months  "

## 2019-02-01 ENCOUNTER — OFFICE VISIT (OUTPATIENT)
Dept: MEDICAL GROUP | Facility: MEDICAL CENTER | Age: 64
End: 2019-02-01
Attending: FAMILY MEDICINE
Payer: MEDICAID

## 2019-02-01 VITALS
HEIGHT: 64 IN | DIASTOLIC BLOOD PRESSURE: 64 MMHG | RESPIRATION RATE: 20 BRPM | HEART RATE: 78 BPM | OXYGEN SATURATION: 95 % | WEIGHT: 157 LBS | BODY MASS INDEX: 26.8 KG/M2 | SYSTOLIC BLOOD PRESSURE: 122 MMHG | TEMPERATURE: 98.4 F

## 2019-02-01 DIAGNOSIS — M51.34 THORACIC DEGENERATIVE DISC DISEASE: ICD-10-CM

## 2019-02-01 DIAGNOSIS — M79.672 CHRONIC FOOT PAIN, LEFT: ICD-10-CM

## 2019-02-01 DIAGNOSIS — M54.12 CERVICAL RADICULOPATHY: ICD-10-CM

## 2019-02-01 DIAGNOSIS — G89.29 CHRONIC FOOT PAIN, LEFT: ICD-10-CM

## 2019-02-01 PROCEDURE — 99212 OFFICE O/P EST SF 10 MIN: CPT | Performed by: FAMILY MEDICINE

## 2019-02-01 PROCEDURE — 99213 OFFICE O/P EST LOW 20 MIN: CPT | Performed by: FAMILY MEDICINE

## 2019-02-01 RX ORDER — HYDROCODONE BITARTRATE AND ACETAMINOPHEN 5; 325 MG/1; MG/1
1 TABLET ORAL EVERY 6 HOURS PRN
Qty: 84 TAB | Refills: 0 | Status: SHIPPED | OUTPATIENT
Start: 2019-03-29 | End: 2019-05-07 | Stop reason: SDUPTHER

## 2019-02-01 RX ORDER — HYDROCODONE BITARTRATE AND ACETAMINOPHEN 5; 325 MG/1; MG/1
1 TABLET ORAL EVERY 6 HOURS PRN
Qty: 84 TAB | Refills: 0 | Status: SHIPPED | OUTPATIENT
Start: 2019-03-01 | End: 2019-02-01 | Stop reason: SDUPTHER

## 2019-02-01 RX ORDER — HYDROCODONE BITARTRATE AND ACETAMINOPHEN 5; 325 MG/1; MG/1
1 TABLET ORAL EVERY 6 HOURS PRN
Qty: 84 TAB | Refills: 0 | Status: SHIPPED | OUTPATIENT
Start: 2019-02-01 | End: 2019-02-01 | Stop reason: SDUPTHER

## 2019-02-01 NOTE — PROGRESS NOTES
"Subjective:      Honey Conde is a 63 y.o. female who presents with Medication Refill                 1.  Chronic pain-patient continues to experience pain generated from her neck, mid back and her left ankle (previous ORIF).  She reports she is continuing to work at a [employer] although she has been busy the last 2 weeks helping to move her household to a new residence in town.  Patient has not had any problems with lost or stolen prescriptions, early refills.    ROS patient denies constipation, confusion unexplained headaches       Objective:     /64 (BP Location: Left arm, Patient Position: Sitting, BP Cuff Size: Adult)   Pulse 78   Temp 36.9 °C (98.4 °F) (Temporal)   Resp 20   Ht 1.626 m (5' 4\")   Wt 71.2 kg (157 lb)   LMP 03/10/2004   SpO2 95%   BMI 26.95 kg/m²      Physical Exam  Gen.- alert, cooperative, in no acute distress  Neck- midline trachea, thyroid not enlarged or tender,supple, no cervical adenopathy  Chest-clear to auscultation and percussion with normal breath sounds. No retractions. Chest wall nontender  Cardiac- regular rhythm and rate. No murmur, thrill, or heave  Back-1+ tender at about C6-C7, again at about T6-T8 in the midline and in the left para sacral area.  No overlying redness or swelling          Assessment/Plan:     1. Chronic foot pain, left      2. Thoracic degenerative disc disease      3. Cervical radiculopathy    Plan: 1.  Renew Norco 5/325, #84 each 4 weeks x 3 months  2.  We will collect UDS today for [employer] (collection was forgotten at our last visit by staff)  3.  Revisit with me in 3 months    "

## 2019-02-26 ENCOUNTER — OFFICE VISIT (OUTPATIENT)
Dept: MEDICAL GROUP | Facility: MEDICAL CENTER | Age: 64
End: 2019-02-26
Attending: FAMILY MEDICINE
Payer: MEDICAID

## 2019-02-26 VITALS
WEIGHT: 152 LBS | TEMPERATURE: 98.4 F | HEART RATE: 80 BPM | BODY MASS INDEX: 25.95 KG/M2 | DIASTOLIC BLOOD PRESSURE: 58 MMHG | RESPIRATION RATE: 16 BRPM | HEIGHT: 64 IN | OXYGEN SATURATION: 89 % | SYSTOLIC BLOOD PRESSURE: 114 MMHG

## 2019-02-26 DIAGNOSIS — J20.9 ACUTE BRONCHITIS, UNSPECIFIED ORGANISM: ICD-10-CM

## 2019-02-26 DIAGNOSIS — J01.00 ACUTE NON-RECURRENT MAXILLARY SINUSITIS: ICD-10-CM

## 2019-02-26 PROCEDURE — 99212 OFFICE O/P EST SF 10 MIN: CPT | Performed by: FAMILY MEDICINE

## 2019-02-26 PROCEDURE — 99213 OFFICE O/P EST LOW 20 MIN: CPT | Performed by: FAMILY MEDICINE

## 2019-02-26 RX ORDER — SULFAMETHOXAZOLE AND TRIMETHOPRIM 800; 160 MG/1; MG/1
1 TABLET ORAL 2 TIMES DAILY
Qty: 14 TAB | Refills: 0 | Status: SHIPPED | OUTPATIENT
Start: 2019-02-26 | End: 2020-11-03

## 2019-02-26 NOTE — PROGRESS NOTES
"Subjective:      Honey Conde is a 63 y.o. female who presents with Cough and Nasal Congestion            HPI 1.  Acute bronchitis/sinusitis-patient reports onset 10 days ago of a severe persistent cough productive of green and at times blood-tinged sputum, also with copious green nasal discharge.  Sleep is disturbed by her cough.  She reports temps last week as high as 104.  She has had only one episode of diarrhea and no emesis.  Patient does use her vape device normally 6-8 times per day.    ROS notes an occasional faint wheeze denies current chest pain or chest pressure       Objective:     /58 (BP Location: Left arm, Patient Position: Sitting, BP Cuff Size: Adult)   Pulse 80   Temp 36.9 °C (98.4 °F) (Temporal)   Resp 16   Ht 1.626 m (5' 4.02\")   Wt 68.9 kg (152 lb)   LMP 03/10/2004   SpO2 89%   BMI 26.08 kg/m²      Physical Exam  General- alert,cooperative patient in no acute distress  Ears- normal tms without redness, perforation. Canals unremarkable  Nares- clear, pink, moist mucosa without bleeding. No purulent nasal DC  Orophx.- lips normal. Clear, pink, moist mucosa without redness or exudate. Tongue is midline  Chest-Normal to auscultation and percussion, movement is symmetric. Nontender to palpation.  Sinuses-1+ tender in the maxillary areas nontender frontal areas            Assessment/Plan:     1. Acute bronchitis, unspecified organism      2. Acute non-recurrent maxillary sinusitis    Plan: 1.  Trial of Bactrim DS twice daily times 7 days  2.  Patient declines trial of a albuterol rescue inhaler at this time  3.  Encouraged ongoing generous daily fluid intake  4.  Follow-up within 1 week if not improving    "

## 2019-05-07 ENCOUNTER — OFFICE VISIT (OUTPATIENT)
Dept: MEDICAL GROUP | Facility: MEDICAL CENTER | Age: 64
End: 2019-05-07
Attending: FAMILY MEDICINE
Payer: MEDICAID

## 2019-05-07 VITALS
BODY MASS INDEX: 25.78 KG/M2 | HEIGHT: 64 IN | OXYGEN SATURATION: 94 % | WEIGHT: 151 LBS | RESPIRATION RATE: 16 BRPM | SYSTOLIC BLOOD PRESSURE: 110 MMHG | HEART RATE: 64 BPM | DIASTOLIC BLOOD PRESSURE: 62 MMHG | TEMPERATURE: 98.6 F

## 2019-05-07 DIAGNOSIS — M51.34 THORACIC DEGENERATIVE DISC DISEASE: ICD-10-CM

## 2019-05-07 DIAGNOSIS — M79.621 AXILLARY PAIN, RIGHT: ICD-10-CM

## 2019-05-07 DIAGNOSIS — M54.12 CERVICAL RADICULOPATHY: ICD-10-CM

## 2019-05-07 PROCEDURE — 99213 OFFICE O/P EST LOW 20 MIN: CPT | Performed by: FAMILY MEDICINE

## 2019-05-07 RX ORDER — HYDROCODONE BITARTRATE AND ACETAMINOPHEN 5; 325 MG/1; MG/1
1 TABLET ORAL EVERY 6 HOURS PRN
Qty: 84 TAB | Refills: 0 | Status: SHIPPED | OUTPATIENT
Start: 2019-07-02 | End: 2019-08-06 | Stop reason: SDUPTHER

## 2019-05-07 RX ORDER — HYDROCODONE BITARTRATE AND ACETAMINOPHEN 5; 325 MG/1; MG/1
1 TABLET ORAL EVERY 6 HOURS PRN
Qty: 84 TAB | Refills: 0 | Status: SHIPPED | OUTPATIENT
Start: 2019-05-07 | End: 2019-05-07 | Stop reason: SDUPTHER

## 2019-05-07 RX ORDER — HYDROCODONE BITARTRATE AND ACETAMINOPHEN 5; 325 MG/1; MG/1
1 TABLET ORAL EVERY 6 HOURS PRN
Qty: 84 TAB | Refills: 0 | Status: SHIPPED | OUTPATIENT
Start: 2019-06-04 | End: 2019-05-07 | Stop reason: SDUPTHER

## 2019-05-07 RX ORDER — SULFAMETHOXAZOLE AND TRIMETHOPRIM 800; 160 MG/1; MG/1
1 TABLET ORAL 2 TIMES DAILY
Qty: 20 TAB | Refills: 0 | Status: SHIPPED | OUTPATIENT
Start: 2019-05-07 | End: 2020-11-03

## 2019-05-07 ASSESSMENT — PATIENT HEALTH QUESTIONNAIRE - PHQ9
5. POOR APPETITE OR OVEREATING: NOT AT ALL
9. THOUGHTS THAT YOU WOULD BE BETTER OFF DEAD, OR OF HURTING YOURSELF: NOT AT ALL
7. TROUBLE CONCENTRATING ON THINGS, SUCH AS READING THE NEWSPAPER OR WATCHING TELEVISION: NOT AT ALL
SUM OF ALL RESPONSES TO PHQ9 QUESTIONS 1 AND 2: 0
2. FEELING DOWN, DEPRESSED, IRRITABLE, OR HOPELESS: NOT AT ALL
6. FEELING BAD ABOUT YOURSELF - OR THAT YOU ARE A FAILURE OR HAVE LET YOURSELF OR YOUR FAMILY DOWN: NOT AL ALL
SUM OF ALL RESPONSES TO PHQ QUESTIONS 1-9: 3
3. TROUBLE FALLING OR STAYING ASLEEP OR SLEEPING TOO MUCH: SEVERAL DAYS
1. LITTLE INTEREST OR PLEASURE IN DOING THINGS: NOT AT ALL
4. FEELING TIRED OR HAVING LITTLE ENERGY: MORE THAN HALF THE DAYS
8. MOVING OR SPEAKING SO SLOWLY THAT OTHER PEOPLE COULD HAVE NOTICED. OR THE OPPOSITE, BEING SO FIGETY OR RESTLESS THAT YOU HAVE BEEN MOVING AROUND A LOT MORE THAN USUAL: NOT AT ALL

## 2019-05-07 NOTE — PROGRESS NOTES
"Subjective:      Honey Conde is a 63 y.o. female who presents with No chief complaint on file.            HPI 1.  Right axillary pain-patient reports onset of pain and a tender lump in her right axilla about 1 week ago.  She has not noticed any sores or other painful areas on her breast lateral neck or upper chest area.    ROS negative for fever, recent chest/arm trauma, widespread rash       Objective:     /62 (BP Location: Left arm, Patient Position: Sitting, BP Cuff Size: Adult)   Pulse 64   Temp 37 °C (98.6 °F) (Temporal)   Resp 16   Ht 1.626 m (5' 4.02\")   Wt 68.5 kg (151 lb)   LMP 03/10/2004   SpO2 94%   BMI 25.91 kg/m²      Physical Exam  General-alert cooperative female in no acute distress  Right axilla-there is a central 67 mm slightly thickened very faint pink papule with no pustule in the central area of her right axilla.  Entire axillary recess is 1+ tender to palpation with no redness or swelling.  No red streaks are observed.  Right breast-breast contour is unremarkable without focal tenderness or palpable nodularity.          Assessment/Plan:     1. Thoracic degenerative disc disease    - HYDROcodone-acetaminophen (NORCO) 5-325 MG Tab per tablet; Take 1 Tab by mouth every 6 hours as needed for up to 28 days.  Dispense: 84 Tab; Refill: 0    2. Cervical radiculopathy    - HYDROcodone-acetaminophen (NORCO) 5-325 MG Tab per tablet; Take 1 Tab by mouth every 6 hours as needed for up to 28 days.  Dispense: 84 Tab; Refill: 0    3. Axillary pain, right      Plan: 1.  Renew chronic Norco 5/325, 3 times daily  2.  Rx Septra DS twice daily x10 days  3.  Warm compresses to the right axilla 3 times daily  4.  Follow-up as needed  5.  Patient is aware that annual mammograms are due in June "

## 2019-06-12 ENCOUNTER — TELEPHONE (OUTPATIENT)
Dept: MEDICAL GROUP | Facility: MEDICAL CENTER | Age: 64
End: 2019-06-12

## 2019-06-12 DIAGNOSIS — N63.0 BREAST LUMP IN FEMALE: ICD-10-CM

## 2019-06-12 NOTE — TELEPHONE ENCOUNTER
Scheduling left vm, states the pt has a lump. Scheduling is requesting the order be changed to B/L mammogram with US as needed based on results.  Thank you, please advise.

## 2019-06-25 ENCOUNTER — HOSPITAL ENCOUNTER (OUTPATIENT)
Dept: RADIOLOGY | Facility: MEDICAL CENTER | Age: 64
End: 2019-06-25
Attending: FAMILY MEDICINE
Payer: MEDICAID

## 2019-06-25 DIAGNOSIS — N63.0 BREAST LUMP IN FEMALE: ICD-10-CM

## 2019-06-25 PROCEDURE — G0279 TOMOSYNTHESIS, MAMMO: HCPCS

## 2019-06-25 PROCEDURE — 76642 ULTRASOUND BREAST LIMITED: CPT | Mod: RT

## 2019-08-06 ENCOUNTER — OFFICE VISIT (OUTPATIENT)
Dept: MEDICAL GROUP | Facility: MEDICAL CENTER | Age: 64
End: 2019-08-06
Attending: FAMILY MEDICINE
Payer: MEDICAID

## 2019-08-06 VITALS
TEMPERATURE: 97.2 F | WEIGHT: 151 LBS | OXYGEN SATURATION: 95 % | BODY MASS INDEX: 25.78 KG/M2 | HEART RATE: 64 BPM | HEIGHT: 64 IN | RESPIRATION RATE: 16 BRPM | DIASTOLIC BLOOD PRESSURE: 74 MMHG | SYSTOLIC BLOOD PRESSURE: 110 MMHG

## 2019-08-06 DIAGNOSIS — M54.12 CERVICAL RADICULOPATHY: ICD-10-CM

## 2019-08-06 DIAGNOSIS — M51.34 THORACIC DEGENERATIVE DISC DISEASE: ICD-10-CM

## 2019-08-06 DIAGNOSIS — L84 CORN OF TOE: ICD-10-CM

## 2019-08-06 PROCEDURE — 99213 OFFICE O/P EST LOW 20 MIN: CPT | Performed by: FAMILY MEDICINE

## 2019-08-06 RX ORDER — HYDROCODONE BITARTRATE AND ACETAMINOPHEN 5; 325 MG/1; MG/1
1 TABLET ORAL EVERY 6 HOURS PRN
Qty: 84 TAB | Refills: 0 | Status: SHIPPED | OUTPATIENT
Start: 2019-10-01 | End: 2019-11-05 | Stop reason: SDUPTHER

## 2019-08-06 RX ORDER — HYDROCODONE BITARTRATE AND ACETAMINOPHEN 5; 325 MG/1; MG/1
1 TABLET ORAL EVERY 6 HOURS PRN
Qty: 84 TAB | Refills: 0 | Status: SHIPPED | OUTPATIENT
Start: 2019-08-06 | End: 2019-08-06 | Stop reason: SDUPTHER

## 2019-08-06 RX ORDER — HYDROCODONE BITARTRATE AND ACETAMINOPHEN 5; 325 MG/1; MG/1
1 TABLET ORAL EVERY 6 HOURS PRN
Qty: 84 TAB | Refills: 0 | Status: SHIPPED | OUTPATIENT
Start: 2019-09-03 | End: 2019-08-06 | Stop reason: SDUPTHER

## 2019-08-06 NOTE — PROGRESS NOTES
"Subjective:      Honey Conde is a 63 y.o. female who presents with No chief complaint on file.            HPI 1.  Chronic neck/thoracic spine pain-patient reports ongoing neck and upper back pain.  Recently she has had increasing soreness at the end of her workday in her lower back.  No pain is radiating into either leg.  She denies any urinary or fecal incontinence.  Is continued to get modest relief using the Norco 5/325 3 times daily.  2.  Right foot pain-patient is noted over the past 2 to 3 weeks marked tenderness in the webspace between her fourth and fifth toe on her right foot.  There is been no localized trauma or drainage.    ROS negative for numbness, constipation, difficulty swallowing       Objective:     /74 (BP Location: Left arm, Patient Position: Sitting, BP Cuff Size: Adult)   Pulse 64   Temp 36.2 °C (97.2 °F) (Temporal)   Resp 16   Ht 1.626 m (5' 4.02\")   Wt 68.5 kg (151 lb)   LMP 03/10/2004   SpO2 95%   BMI 25.91 kg/m²      Physical Exam  Gen.- alert, cooperative, in no acute distress  Neck- midline trachea, thyroid not enlarged or tender,supple, no cervical adenopathy  Chest-clear to auscultation and percussion with normal breath sounds. No retractions. Chest wall nontender  Cardiac- regular rhythm and rate. No murmur, thrill, or heave  Back-1+ tender in the midline from about T8-S4.  Right foot-prominence of the PIP joint of the right fourth toe is creating a corn situation against the fifth toe.  No ulcer or crusting          Assessment/Plan:     1. Thoracic degenerative disc disease    - URINE DRUG SCREEN W/CONF (SEND OUT); Future  - HYDROcodone-acetaminophen (NORCO) 5-325 MG Tab per tablet; Take 1 Tab by mouth every 6 hours as needed for up to 28 days.  Dispense: 84 Tab; Refill: 0    2. Cervical radiculopathy    - URINE DRUG SCREEN W/CONF (SEND OUT); Future  - HYDROcodone-acetaminophen (NORCO) 5-325 MG Tab per tablet; Take 1 Tab by mouth every 6 hours as needed for up " to 28 days.  Dispense: 84 Tab; Refill: 0    3. Corn of toe    Plan: 1.  Discussed use of corn pads and just simple pressure pads between the fourth and fifth toes  2.  Renew Norco x3 months  3.  Collect UDS today  4.  Revisit 3 months

## 2019-11-05 ENCOUNTER — OFFICE VISIT (OUTPATIENT)
Dept: MEDICAL GROUP | Facility: MEDICAL CENTER | Age: 64
End: 2019-11-05
Attending: FAMILY MEDICINE
Payer: MEDICAID

## 2019-11-05 VITALS
WEIGHT: 151 LBS | SYSTOLIC BLOOD PRESSURE: 114 MMHG | DIASTOLIC BLOOD PRESSURE: 62 MMHG | RESPIRATION RATE: 16 BRPM | OXYGEN SATURATION: 93 % | HEART RATE: 68 BPM | BODY MASS INDEX: 25.78 KG/M2 | TEMPERATURE: 97.2 F | HEIGHT: 64 IN

## 2019-11-05 DIAGNOSIS — M54.12 CERVICAL RADICULOPATHY: ICD-10-CM

## 2019-11-05 DIAGNOSIS — G89.29 CHRONIC FOOT PAIN, LEFT: ICD-10-CM

## 2019-11-05 DIAGNOSIS — M79.672 CHRONIC FOOT PAIN, LEFT: ICD-10-CM

## 2019-11-05 DIAGNOSIS — M51.34 THORACIC DEGENERATIVE DISC DISEASE: ICD-10-CM

## 2019-11-05 PROCEDURE — 99213 OFFICE O/P EST LOW 20 MIN: CPT | Performed by: FAMILY MEDICINE

## 2019-11-05 RX ORDER — HYDROCODONE BITARTRATE AND ACETAMINOPHEN 5; 325 MG/1; MG/1
1 TABLET ORAL EVERY 6 HOURS PRN
Qty: 84 TAB | Refills: 0 | Status: SHIPPED | OUTPATIENT
Start: 2019-11-05 | End: 2019-11-05 | Stop reason: SDUPTHER

## 2019-11-05 RX ORDER — HYDROCODONE BITARTRATE AND ACETAMINOPHEN 5; 325 MG/1; MG/1
1 TABLET ORAL EVERY 6 HOURS PRN
Qty: 84 TAB | Refills: 0 | Status: SHIPPED | OUTPATIENT
Start: 2019-12-03 | End: 2019-11-05 | Stop reason: SDUPTHER

## 2019-11-05 RX ORDER — HYDROCODONE BITARTRATE AND ACETAMINOPHEN 5; 325 MG/1; MG/1
1 TABLET ORAL EVERY 6 HOURS PRN
Qty: 84 TAB | Refills: 0 | Status: SHIPPED | OUTPATIENT
Start: 2019-12-31 | End: 2020-02-04 | Stop reason: SDUPTHER

## 2019-11-05 NOTE — PROGRESS NOTES
"Subjective:      Honey Conde is a 64 y.o. female who presents with No chief complaint on file.            HPI 1.  Chronic pain-patient continues to experience daily pain generated from her neck mid thoracic spine and previous ORIF left ankle fracture.  She manages using Norco 5/325 typically 1 to 3 tablets/day.  She has not had any lost prescriptions.  Not reporting constipation, abdominal bloating  Patient has been able to afford to reduce her daily hours per week down to about 25 from 40 which she is enjoying.  ROS negative for chest pain, palpitations, constipation       Objective:     /62 (BP Location: Left arm, Patient Position: Sitting, BP Cuff Size: Adult)   Pulse 68   Temp 36.2 °C (97.2 °F) (Temporal)   Resp 16   Ht 1.626 m (5' 4.02\")   Wt 68.5 kg (151 lb)   LMP 03/10/2004   SpO2 93%   BMI 25.91 kg/m²      Physical Exam  Gen.- alert, cooperative, in no acute distress  Neck- midline trachea, thyroid not enlarged or tender,supple, no cervical adenopathy  Chest-clear to auscultation and percussion with normal breath sounds. No retractions. Chest wall nontender  Cardiac- regular rhythm and rate. No murmur, thrill, or heave  Abdomen-Abdomen is soft, nontender, normal bowel sounds, no masses, guarding, or organomegaly.       UDS-August 2019-appropriate     Assessment/Plan:       1. Thoracic degenerative disc disease    - HYDROcodone-acetaminophen (NORCO) 5-325 MG Tab per tablet; Take 1 Tab by mouth every 6 hours as needed for up to 28 days.  Dispense: 84 Tab; Refill: 0    2. Cervical radiculopathy    - HYDROcodone-acetaminophen (NORCO) 5-325 MG Tab per tablet; Take 1 Tab by mouth every 6 hours as needed for up to 28 days.  Dispense: 84 Tab; Refill: 0    3. Chronic foot pain, left    Plan: 1.  Renew Norco 5/325, #84 with 2 refills  "

## 2020-02-04 ENCOUNTER — OFFICE VISIT (OUTPATIENT)
Dept: MEDICAL GROUP | Facility: MEDICAL CENTER | Age: 65
End: 2020-02-04
Attending: FAMILY MEDICINE
Payer: MEDICAID

## 2020-02-04 VITALS
HEIGHT: 64 IN | TEMPERATURE: 98.3 F | RESPIRATION RATE: 16 BRPM | HEART RATE: 64 BPM | BODY MASS INDEX: 25.44 KG/M2 | SYSTOLIC BLOOD PRESSURE: 112 MMHG | OXYGEN SATURATION: 92 % | WEIGHT: 149 LBS | DIASTOLIC BLOOD PRESSURE: 70 MMHG

## 2020-02-04 DIAGNOSIS — M51.34 THORACIC DEGENERATIVE DISC DISEASE: ICD-10-CM

## 2020-02-04 DIAGNOSIS — M54.12 CERVICAL RADICULOPATHY: ICD-10-CM

## 2020-02-04 DIAGNOSIS — J01.00 ACUTE MAXILLARY SINUSITIS, RECURRENCE NOT SPECIFIED: ICD-10-CM

## 2020-02-04 PROCEDURE — 99213 OFFICE O/P EST LOW 20 MIN: CPT | Performed by: FAMILY MEDICINE

## 2020-02-04 RX ORDER — HYDROCODONE BITARTRATE AND ACETAMINOPHEN 5; 325 MG/1; MG/1
1 TABLET ORAL EVERY 8 HOURS PRN
Qty: 84 TAB | Refills: 0 | Status: SHIPPED | OUTPATIENT
Start: 2020-02-04 | End: 2020-02-04 | Stop reason: SDUPTHER

## 2020-02-04 RX ORDER — HYDROCODONE BITARTRATE AND ACETAMINOPHEN 5; 325 MG/1; MG/1
1 TABLET ORAL EVERY 8 HOURS PRN
Qty: 84 TAB | Refills: 0 | Status: SHIPPED | OUTPATIENT
Start: 2020-03-03 | End: 2020-03-31

## 2020-02-04 RX ORDER — AZITHROMYCIN 250 MG/1
TABLET, FILM COATED ORAL
Qty: 6 TAB | Refills: 0 | Status: SHIPPED
Start: 2020-02-04 | End: 2020-11-03

## 2020-02-04 RX ORDER — HYDROCODONE BITARTRATE AND ACETAMINOPHEN 5; 325 MG/1; MG/1
1 TABLET ORAL EVERY 8 HOURS PRN
Qty: 84 TAB | Refills: 0 | Status: SHIPPED | OUTPATIENT
Start: 2020-03-31 | End: 2020-04-28

## 2020-02-04 NOTE — PROGRESS NOTES
"Subjective:      Honey Conde is a 64 y.o. female who presents with Sinusitis            HPI 1.  Chronic pain-patient returns for follow-up and renewal of her hydrocodone 5/325 medication that she takes 3 times daily.  Current pain generators continue to include her neck, upper thoracic spine and occasionally her left ankle site of a previous ORIF.  Patient not reporting confusion or constipation.  2.  Acute maxillary sinusitis-patient reports onset 4 days ago of mid facial pressure and congestion along with persistent plugging of her right ear.  She has not noticed any fever but does report sore throat    ROS negative for rash, diarrhea, positive cough which is minimally productive       Objective:     /70 (BP Location: Left arm, Patient Position: Sitting, BP Cuff Size: Adult)   Pulse 64   Temp 36.8 °C (98.3 °F) (Temporal)   Resp 16   Ht 1.626 m (5' 4.02\")   Wt 67.6 kg (149 lb)   LMP 03/10/2004   SpO2 92%   BMI 25.56 kg/m²      Physical Exam  General-alert cooperative female in no acute distress  Ears-left TM is clear with a normal-appearing drum.  Right TM is occluded by soft brown wax which is scalloped from Q-tip use  Sinuses-1+ tender over the maxillary areas bilaterally  Chest- clear breath sounds without wheezes, rales, ronchi. No retractions. Chest wall nontender.  Oropharynx-clear pink moist mucosa without swelling redness or exudate          Assessment/Plan:       1. Thoracic degenerative disc disease    - HYDROcodone-acetaminophen (NORCO) 5-325 MG Tab per tablet; Take 1 Tab by mouth every 8 hours as needed for up to 28 days.  Dispense: 84 Tab; Refill: 0    2. Cervical radiculopathy    - HYDROcodone-acetaminophen (NORCO) 5-325 MG Tab per tablet; Take 1 Tab by mouth every 8 hours as needed for up to 28 days.  Dispense: 84 Tab; Refill: 0    3. Acute maxillary sinusitis, recurrence not specified    Plan: 1.  Norco 5/325, #84 per 28 days renewed x3  2.  Rx Zithromax Z-Lauro  3.  Patient " would prefer to try irrigating the wax out of her ear at home  4.  Revisit in 3 months or sooner if needed

## 2020-05-04 ENCOUNTER — TELEPHONE (OUTPATIENT)
Dept: MEDICAL GROUP | Facility: MEDICAL CENTER | Age: 65
End: 2020-05-04

## 2020-05-04 DIAGNOSIS — M54.12 CERVICAL RADICULOPATHY: ICD-10-CM

## 2020-05-04 DIAGNOSIS — Z12.39 SCREENING FOR MALIGNANT NEOPLASM OF BREAST: ICD-10-CM

## 2020-05-04 DIAGNOSIS — M51.34 THORACIC DEGENERATIVE DISC DISEASE: ICD-10-CM

## 2020-05-04 RX ORDER — HYDROCODONE BITARTRATE AND ACETAMINOPHEN 5; 325 MG/1; MG/1
1 TABLET ORAL EVERY 6 HOURS PRN
Qty: 84 TAB | Refills: 0 | Status: SHIPPED | OUTPATIENT
Start: 2020-06-29 | End: 2020-07-27

## 2020-05-04 RX ORDER — HYDROCODONE BITARTRATE AND ACETAMINOPHEN 5; 325 MG/1; MG/1
1 TABLET ORAL EVERY 6 HOURS PRN
Qty: 84 TAB | Refills: 0 | Status: SHIPPED | OUTPATIENT
Start: 2020-05-04 | End: 2020-08-10 | Stop reason: SDUPTHER

## 2020-05-04 RX ORDER — HYDROCODONE BITARTRATE AND ACETAMINOPHEN 5; 325 MG/1; MG/1
1 TABLET ORAL EVERY 6 HOURS PRN
Qty: 84 TAB | Refills: 0 | Status: SHIPPED | OUTPATIENT
Start: 2020-06-01 | End: 2020-06-29

## 2020-05-04 NOTE — TELEPHONE ENCOUNTER
1. Name: Honey      Call Back Number: [phone]  How would the patient prefer to be contacted with a response: Phone call OK to leave a detailed message    Honey was turned away for today's appt due to congestion. She is requesting her Norco Rx. Please call her regarding her medication request.

## 2020-05-04 NOTE — TELEPHONE ENCOUNTER
1. Name: Honey      Call Back Number: [phone]      How would the patient prefer to be contacted with a response: Phone call OK to leave a detailed message    Honey came to the clinic to  her Rx's and while here she asked if she can have a referral for a Mammogram from [facility]. She could not come inside today due to being congested.

## 2020-05-04 NOTE — TELEPHONE ENCOUNTER
Please let patient know that I have printed her next 3 months worth of Norco prescription, and they can be picked up at our office.

## 2020-05-05 NOTE — TELEPHONE ENCOUNTER
Please let patient know mammogram ordered, not sure they are scheduling screening mammograms immediately right now.

## 2020-05-09 ENCOUNTER — OFFICE VISIT (OUTPATIENT)
Dept: URGENT CARE | Facility: CLINIC | Age: 65
End: 2020-05-09
Payer: MEDICAID

## 2020-05-09 VITALS
HEIGHT: 64 IN | BODY MASS INDEX: 25.44 KG/M2 | DIASTOLIC BLOOD PRESSURE: 78 MMHG | TEMPERATURE: 98.5 F | HEART RATE: 80 BPM | WEIGHT: 149 LBS | SYSTOLIC BLOOD PRESSURE: 130 MMHG | RESPIRATION RATE: 12 BRPM | OXYGEN SATURATION: 91 %

## 2020-05-09 DIAGNOSIS — H61.21 IMPACTED CERUMEN, RIGHT EAR: ICD-10-CM

## 2020-05-09 PROCEDURE — 99213 OFFICE O/P EST LOW 20 MIN: CPT | Performed by: NURSE PRACTITIONER

## 2020-05-09 NOTE — PROGRESS NOTES
Subjective:      Honey Conde is a 64 y.o. female who presents with Otalgia (R Ear for a couple months)    Past Medical History:   Diagnosis Date   • Anxiety    • Arthritis     neck and back   • Bone spur     in R side of neck   • Bowel habit changes     constipation and diarrhea   • Depression    • Gynecological disorder    • Heart burn    • Hiatus hernia syndrome    • Snoring    • Urinary incontinence      Social History     Socioeconomic History   • Marital status: Single     Spouse name: Not on file   • Number of children: Not on file   • Years of education: Not on file   • Highest education level: Not on file   Occupational History   • Not on file   Social Needs   • Financial resource strain: Not on file   • Food insecurity     Worry: Not on file     Inability: Not on file   • Transportation needs     Medical: Not on file     Non-medical: Not on file   Tobacco Use   • Smoking status: Light Tobacco Smoker     Packs/day: 0.50     Years: 30.00     Pack years: 15.00     Last attempt to quit: 2015     Years since quittin.4   • Smokeless tobacco: Never Used   • Tobacco comment: vapes   Substance and Sexual Activity   • Alcohol use: No     Alcohol/week: 0.0 oz     Comment: intermittently   • Drug use: No     Comment: e cigarettes w nicotine   • Sexual activity: Never     Partners: Male   Lifestyle   • Physical activity     Days per week: Not on file     Minutes per session: Not on file   • Stress: Not on file   Relationships   • Social connections     Talks on phone: Not on file     Gets together: Not on file     Attends Holiness service: Not on file     Active member of club or organization: Not on file     Attends meetings of clubs or organizations: Not on file     Relationship status: Not on file   • Intimate partner violence     Fear of current or ex partner: Not on file     Emotionally abused: Not on file     Physically abused: Not on file     Forced sexual activity: Not on file   Other Topics  "Concern   • Not on file   Social History Narrative   • Not on file     Family History   Problem Relation Age of Onset   • Cancer Mother 76        breast   • Heart Disease Maternal Uncle    • Diabetes Maternal Grandmother    • Stroke Neg Hx        Allergies: Ceclor [cefaclor]; Erythromycin; Kiwi extract; and Pcn [penicillins]    Patient is a 64-year-old female who presents today with complaint of tinnitus and discomfort to the right ear.  She states symptoms have been ongoing over the last 2 to 3 months.  Was seen by her primary care physician who did advise her that she had wax impacted in the right ear.  Patient states she elected to try to remove this herself but has not had good results.  At this time she continues to have intermittent tinnitus and thinks that there may still be impacted cerumen in the right EAC.        Otalgia    There is pain in the right ear. This is a new problem. The current episode started in the past 7 days. The problem occurs constantly. The problem has been unchanged. There has been no fever. Associated symptoms include hearing loss. She has tried nothing for the symptoms. The treatment provided no relief.       Review of Systems   HENT: Positive for ear pain and hearing loss.    All other systems reviewed and are negative.         Objective:     Ht 1.626 m (5' 4\")   Wt 67.6 kg (149 lb)   LMP 03/10/2004   BMI 25.58 kg/m²      Physical Exam  Vitals signs reviewed.   Constitutional:       Appearance: Normal appearance.   HENT:      Head: Normocephalic and atraumatic.      Right Ear: There is impacted cerumen.      Left Ear: There is no impacted cerumen.      Ears:      Comments: Left EAC is clear and left TM is easily visualized.  No redness, swelling, or bulging noted.  The right EAC is filled with impacted cerumen.  I am unable to visualize the tympanic membrane.     Nose: Nose normal.      Mouth/Throat:      Mouth: Mucous membranes are moist.   Eyes:      Extraocular Movements: " Extraocular movements intact.      Conjunctiva/sclera: Conjunctivae normal.      Pupils: Pupils are equal, round, and reactive to light.   Neck:      Musculoskeletal: Normal range of motion and neck supple.   Cardiovascular:      Rate and Rhythm: Regular rhythm.      Heart sounds: Normal heart sounds.   Pulmonary:      Effort: Pulmonary effort is normal.      Breath sounds: Normal breath sounds.   Musculoskeletal: Normal range of motion.   Skin:     General: Skin is dry.      Capillary Refill: Capillary refill takes less than 2 seconds.   Neurological:      Mental Status: She is alert.       Post lavage:    Moderate amount of impacted cerumen expectorated from the right EAC.  Some residual wax remains that we are not able to remove.  I do see a small portion of the TM and does appear to be within normal limits.  Patient does report subjective improvement.  I have advised her at this time to use Debrox or Cerumenex and return in 1 to 2 weeks and we will attempt to lavage again.  Patient verbalized understanding and agreement.          Assessment/Plan:   Cerumen impaction, right ear    Follow-up as directed above  Return to urgent care otherwise for any further questions or concerns    There are no diagnoses linked to this encounter.

## 2020-05-14 ENCOUNTER — TELEPHONE (OUTPATIENT)
Dept: MEDICAL GROUP | Facility: MEDICAL CENTER | Age: 65
End: 2020-05-14

## 2020-05-14 NOTE — TELEPHONE ENCOUNTER
VOICEMAIL  1. Caller Name: Honey   Call Back Number: [phone]    2. Message:    Honey went into Urgent Care on Saturday for her right ear, they had told her that she would need a referral to an ENT since she has something stuck on her right ear.  She would also like a call back.      Please advice.

## 2020-05-14 NOTE — TELEPHONE ENCOUNTER
Serena, I did review your urgent care visit from 5/9/2020.  It sounds like they got significant bit of wax (cerumen) out of the ear but not all of it.  They advised you to use a wax softening drops which I hope you have done.  I think the neck step would be to schedule you to come back into our office and see if we can irrigate the rest of that out or you could do it at urgent care as well, if there is schedule works better for you.  I do not think you need ENT referral unless you have a totally clear drum and canal and are still having symptoms in the future.  Dr. Soler

## 2020-05-14 NOTE — TELEPHONE ENCOUNTER
Phone Number Called: [phone] (home)       Call outcome: Left detailed message for patient. Informed to call back with any additional questions.    Message: LVM in details of message below

## 2020-06-13 ENCOUNTER — HOSPITAL ENCOUNTER (OUTPATIENT)
Dept: RADIOLOGY | Facility: MEDICAL CENTER | Age: 65
End: 2020-06-13
Attending: FAMILY MEDICINE
Payer: MEDICAID

## 2020-06-13 DIAGNOSIS — Z12.39 SCREENING FOR MALIGNANT NEOPLASM OF BREAST: ICD-10-CM

## 2020-06-30 ENCOUNTER — HOSPITAL ENCOUNTER (OUTPATIENT)
Dept: RADIOLOGY | Facility: MEDICAL CENTER | Age: 65
End: 2020-06-30
Attending: FAMILY MEDICINE
Payer: MEDICAID

## 2020-06-30 PROCEDURE — 77067 SCR MAMMO BI INCL CAD: CPT

## 2020-08-10 ENCOUNTER — OFFICE VISIT (OUTPATIENT)
Dept: MEDICAL GROUP | Facility: MEDICAL CENTER | Age: 65
End: 2020-08-10
Attending: FAMILY MEDICINE
Payer: MEDICAID

## 2020-08-10 VITALS
RESPIRATION RATE: 16 BRPM | HEART RATE: 68 BPM | BODY MASS INDEX: 25.1 KG/M2 | DIASTOLIC BLOOD PRESSURE: 52 MMHG | OXYGEN SATURATION: 95 % | HEIGHT: 64 IN | TEMPERATURE: 98.5 F | SYSTOLIC BLOOD PRESSURE: 104 MMHG | WEIGHT: 147 LBS

## 2020-08-10 DIAGNOSIS — M54.12 CERVICAL RADICULOPATHY: ICD-10-CM

## 2020-08-10 DIAGNOSIS — M51.34 THORACIC DEGENERATIVE DISC DISEASE: ICD-10-CM

## 2020-08-10 PROCEDURE — 99213 OFFICE O/P EST LOW 20 MIN: CPT | Performed by: FAMILY MEDICINE

## 2020-08-10 RX ORDER — HYDROCODONE BITARTRATE AND ACETAMINOPHEN 5; 325 MG/1; MG/1
1 TABLET ORAL EVERY 6 HOURS PRN
Qty: 84 TAB | Refills: 0 | Status: SHIPPED | OUTPATIENT
Start: 2020-08-10 | End: 2020-08-10 | Stop reason: SDUPTHER

## 2020-08-10 RX ORDER — HYDROCODONE BITARTRATE AND ACETAMINOPHEN 5; 325 MG/1; MG/1
1 TABLET ORAL EVERY 6 HOURS PRN
Qty: 84 TAB | Refills: 0 | Status: SHIPPED | OUTPATIENT
Start: 2020-10-05 | End: 2020-11-03 | Stop reason: SDUPTHER

## 2020-08-10 RX ORDER — HYDROCODONE BITARTRATE AND ACETAMINOPHEN 5; 325 MG/1; MG/1
1 TABLET ORAL EVERY 6 HOURS PRN
Qty: 84 TAB | Refills: 0 | Status: SHIPPED | OUTPATIENT
Start: 2020-09-07 | End: 2020-08-10 | Stop reason: SDUPTHER

## 2020-08-10 NOTE — PROGRESS NOTES
"Subjective:      Honey Conde is a 64 y.o. female who presents with No chief complaint on file.            HPI 1.  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  She reports fluctuating level of daily neck and upper back pain significantly improved with the Norco 5/325 3 times daily.  She denies any constipation or significant confusion    ROS negative for arm weakness, focal numbness, difficulty swallowing       Objective:     /52 (BP Location: Left arm, Patient Position: Sitting, BP Cuff Size: Adult)   Pulse 68   Temp 36.9 °C (98.5 °F) (Temporal)   Resp 16   Ht 1.626 m (5' 4.02\")   Wt 66.7 kg (147 lb)   LMP 03/10/2004   SpO2 95%   BMI 25.22 kg/m²      Physical Exam  Gen.- alert, cooperative, in no acute distress  Neck- midline trachea, thyroid not enlarged or tender,supple, no cervical adenopathy.  1+ tender bilaterally C6-C7  Chest-clear to auscultation and percussion with normal breath sounds. No retractions. Chest wall nontender  Cardiac- regular rhythm and rate. No murmur, thrill, or heave            Assessment/Plan:        1. Cervical radiculopathy    - HYDROcodone-acetaminophen (NORCO) 5-325 MG Tab per tablet; Take 1 Tab by mouth every 6 hours as needed for up to 28 days.  Dispense: 84 Tab; Refill: 0    2. Thoracic degenerative disc disease    - HYDROcodone-acetaminophen (NORCO) 5-325 MG Tab per tablet; Take 1 Tab by mouth every 6 hours as needed for up to 28 days.  Dispense: 84 Tab; Refill: 0  Plan: 1.  Renew Norco 5/325 3 times daily, #84 with 2 refills  2.  Recheck in 3 months or sooner  "

## 2020-08-11 NOTE — TELEPHONE ENCOUNTER
Phone Number Called: [phone] (home)       Message: Labs show mild elev of chol, 211, and LDL bad chol, 140. Mod abundance of good HDL chol. Excellent  TG. Sugar mildly elevated. Recommend limiting sweets and starches, limiting red meat, cheese, eggs and repeat lipid panel in 6-12 months. Chemistry panel also shows good kidney and liver function     Left Message for patient to call back: yes        
Pt received results in office visit  
: Yes

## 2020-11-03 ENCOUNTER — OFFICE VISIT (OUTPATIENT)
Dept: MEDICAL GROUP | Facility: MEDICAL CENTER | Age: 65
End: 2020-11-03
Attending: FAMILY MEDICINE
Payer: MEDICAID

## 2020-11-03 VITALS
HEART RATE: 68 BPM | HEIGHT: 64 IN | SYSTOLIC BLOOD PRESSURE: 130 MMHG | RESPIRATION RATE: 16 BRPM | BODY MASS INDEX: 25.78 KG/M2 | OXYGEN SATURATION: 94 % | DIASTOLIC BLOOD PRESSURE: 62 MMHG | WEIGHT: 151 LBS | TEMPERATURE: 97.7 F

## 2020-11-03 DIAGNOSIS — M51.34 THORACIC DEGENERATIVE DISC DISEASE: ICD-10-CM

## 2020-11-03 DIAGNOSIS — M54.12 CERVICAL RADICULOPATHY: ICD-10-CM

## 2020-11-03 DIAGNOSIS — H61.21 IMPACTED CERUMEN OF RIGHT EAR: ICD-10-CM

## 2020-11-03 PROCEDURE — 99213 OFFICE O/P EST LOW 20 MIN: CPT | Performed by: FAMILY MEDICINE

## 2020-11-03 RX ORDER — HYDROCODONE BITARTRATE AND ACETAMINOPHEN 5; 325 MG/1; MG/1
1 TABLET ORAL EVERY 6 HOURS PRN
Qty: 84 TAB | Refills: 0 | Status: SHIPPED | OUTPATIENT
Start: 2020-11-04 | End: 2020-11-03 | Stop reason: SDUPTHER

## 2020-11-03 RX ORDER — HYDROCODONE BITARTRATE AND ACETAMINOPHEN 5; 325 MG/1; MG/1
1 TABLET ORAL EVERY 6 HOURS PRN
Qty: 84 TAB | Refills: 0 | Status: SHIPPED | OUTPATIENT
Start: 2020-12-30 | End: 2021-01-27

## 2020-11-03 RX ORDER — HYDROCODONE BITARTRATE AND ACETAMINOPHEN 5; 325 MG/1; MG/1
1 TABLET ORAL EVERY 6 HOURS PRN
Qty: 84 TAB | Refills: 0 | Status: SHIPPED | OUTPATIENT
Start: 2020-12-02 | End: 2020-11-03 | Stop reason: SDUPTHER

## 2020-11-03 NOTE — PROGRESS NOTES
"Subjective:      Honey Conde is a 65 y.o. female who presents with Back Pain            HPI 1.  Chronic pain-patient ports he continues to manage fairly well taking Norco 5/325 up to 3 times daily for recurrent neck and upper back pain.  She continues to work part-time, 5 days/week.  Not reporting any upper extremity numbness or swelling.  2.  Ear pain-patient notes some discomfort and fullness in her right ear.  She has had recurrent problems with cerumen impaction in the past.  She does use ear candling with fairly good success in the past.    ROS       Objective:     /62 (BP Location: Left arm, Patient Position: Sitting, BP Cuff Size: Adult)   Pulse 68   Temp 36.5 °C (97.7 °F) (Temporal)   Resp 16   Ht 1.626 m (5' 4.02\")   Wt 68.5 kg (151 lb)   LMP 03/10/2004   SpO2 94%   BMI 25.91 kg/m²      Physical Exam  Gen.- alert, cooperative, in no acute distress  Neck- midline trachea, thyroid not enlarged or tender,supple, no cervical adenopathy  Chest-clear to auscultation and percussion with normal breath sounds. No retractions. Chest wall nontender  Cardiac- regular rhythm and rate. No murmur, thrill, or heave  Ears-left side is completely clear with a normal drum.  Right side is almost completely blocked by soft orange-brown wax-irrigated clear          Assessment/Plan:        1. Cervical radiculopathy    - HYDROcodone-acetaminophen (NORCO) 5-325 MG Tab per tablet; Take 1 Tab by mouth every 6 hours as needed for up to 28 days.  Dispense: 84 Tab; Refill: 0    2. Thoracic degenerative disc disease    - HYDROcodone-acetaminophen (NORCO) 5-325 MG Tab per tablet; Take 1 Tab by mouth every 6 hours as needed for up to 28 days.  Dispense: 84 Tab; Refill: 0    3. Impacted cerumen of right ear    Plan: 1.  Norco 5/325, #84, 3 times daily x3 months rewritten today  2.  Right ear irrigated  "

## 2021-02-02 ENCOUNTER — OFFICE VISIT (OUTPATIENT)
Dept: MEDICAL GROUP | Facility: MEDICAL CENTER | Age: 66
End: 2021-02-02
Attending: FAMILY MEDICINE
Payer: MEDICAID

## 2021-02-02 VITALS
SYSTOLIC BLOOD PRESSURE: 120 MMHG | BODY MASS INDEX: 26.46 KG/M2 | OXYGEN SATURATION: 96 % | DIASTOLIC BLOOD PRESSURE: 58 MMHG | HEIGHT: 64 IN | RESPIRATION RATE: 16 BRPM | TEMPERATURE: 96.8 F | WEIGHT: 155 LBS | HEART RATE: 72 BPM

## 2021-02-02 DIAGNOSIS — M54.12 CERVICAL RADICULOPATHY: ICD-10-CM

## 2021-02-02 DIAGNOSIS — M51.34 THORACIC DEGENERATIVE DISC DISEASE: ICD-10-CM

## 2021-02-02 PROCEDURE — 99213 OFFICE O/P EST LOW 20 MIN: CPT | Performed by: FAMILY MEDICINE

## 2021-02-02 RX ORDER — HYDROCODONE BITARTRATE AND ACETAMINOPHEN 5; 325 MG/1; MG/1
1 TABLET ORAL EVERY 6 HOURS PRN
Qty: 84 TAB | Refills: 0 | Status: SHIPPED | OUTPATIENT
Start: 2021-04-03 | End: 2021-05-03 | Stop reason: SDUPTHER

## 2021-02-02 RX ORDER — HYDROCODONE BITARTRATE AND ACETAMINOPHEN 5; 325 MG/1; MG/1
1 TABLET ORAL EVERY 6 HOURS PRN
Qty: 84 TAB | Refills: 0 | Status: SHIPPED | OUTPATIENT
Start: 2021-03-06 | End: 2021-02-02 | Stop reason: SDUPTHER

## 2021-02-02 RX ORDER — HYDROCODONE BITARTRATE AND ACETAMINOPHEN 5; 325 MG/1; MG/1
1 TABLET ORAL EVERY 6 HOURS PRN
Qty: 84 TAB | Refills: 0 | Status: SHIPPED | OUTPATIENT
Start: 2021-02-06 | End: 2021-02-02 | Stop reason: SDUPTHER

## 2021-02-02 NOTE — PROGRESS NOTES
"Subjective:      Honey Conde is a 65 y.o. female who presents with Back Pain            HPI 1.  Cervical radiculopathy-patient continues to have daily posterior neck ache and upper thoracic spine aches associated with degenerative osteoarthritis affecting multiple levels in her neck.  She does get relief from taking 2-3 low-dose Norco 5/325 on workdays.  She will take 1-3 on her off days.  Not reporting any constipation or nausea.    ROS negative for dyspnea, chest pain, palpitations       Objective:     /58 (BP Location: Left arm, Patient Position: Sitting, BP Cuff Size: Adult)   Pulse 72   Temp 36 °C (96.8 °F) (Temporal)   Resp 16   Ht 1.626 m (5' 4.02\")   Wt 70.3 kg (155 lb)   LMP 03/10/2004   SpO2 96%   BMI 26.59 kg/m²      Physical Exam  Gen.- alert, cooperative, in no acute distress  Neck- midline trachea, thyroid not enlarged or tender,supple, no cervical adenopathy 1+ tender on the posterior aspect of the neck from about C5-T5 in the midline  Chest-clear to auscultation and percussion with normal breath sounds. No retractions. Chest wall nontender  Cardiac- regular rhythm and rate. No murmur, thrill, or heave     PHQ-9 equals 1       Assessment/Plan:        1. Cervical radiculopathy    - HYDROcodone-acetaminophen (NORCO) 5-325 MG Tab per tablet; Take 1 Tab by mouth every 6 hours as needed for up to 28 days.  Dispense: 84 Tab; Refill: 0    2. Thoracic degenerative disc disease    - HYDROcodone-acetaminophen (NORCO) 5-325 MG Tab per tablet; Take 1 Tab by mouth every 6 hours as needed for up to 28 days.  Dispense: 84 Tab; Refill: 0  Plan: 1.  Norco 5/325 3 times daily refilled x3 months  2.  Several neck and trapezius relaxing stretches demonstrated for patient today  3.  Revisit in 3 months  "

## 2021-05-03 ENCOUNTER — OFFICE VISIT (OUTPATIENT)
Dept: MEDICAL GROUP | Facility: MEDICAL CENTER | Age: 66
End: 2021-05-03
Attending: FAMILY MEDICINE
Payer: MEDICAID

## 2021-05-03 VITALS
SYSTOLIC BLOOD PRESSURE: 112 MMHG | HEART RATE: 68 BPM | BODY MASS INDEX: 27.66 KG/M2 | OXYGEN SATURATION: 92 % | WEIGHT: 162 LBS | DIASTOLIC BLOOD PRESSURE: 58 MMHG | RESPIRATION RATE: 16 BRPM | TEMPERATURE: 97.3 F | HEIGHT: 64 IN

## 2021-05-03 DIAGNOSIS — J30.2 SEASONAL ALLERGIES: ICD-10-CM

## 2021-05-03 DIAGNOSIS — M54.12 CERVICAL RADICULOPATHY: ICD-10-CM

## 2021-05-03 DIAGNOSIS — M51.34 THORACIC DEGENERATIVE DISC DISEASE: ICD-10-CM

## 2021-05-03 PROCEDURE — 99213 OFFICE O/P EST LOW 20 MIN: CPT | Performed by: FAMILY MEDICINE

## 2021-05-03 RX ORDER — HYDROCODONE BITARTRATE AND ACETAMINOPHEN 5; 325 MG/1; MG/1
1 TABLET ORAL EVERY 6 HOURS PRN
Qty: 84 TABLET | Refills: 0 | Status: SHIPPED | OUTPATIENT
Start: 2021-06-01 | End: 2021-06-29

## 2021-05-03 RX ORDER — HYDROCODONE BITARTRATE AND ACETAMINOPHEN 5; 325 MG/1; MG/1
1 TABLET ORAL EVERY 6 HOURS PRN
Qty: 84 TABLET | Refills: 0 | Status: SHIPPED | OUTPATIENT
Start: 2021-06-29 | End: 2021-07-27

## 2021-05-03 RX ORDER — CETIRIZINE HYDROCHLORIDE 10 MG/1
10 TABLET ORAL DAILY
Qty: 30 TABLET | Refills: 6 | Status: SHIPPED | OUTPATIENT
Start: 2021-05-03 | End: 2022-09-05

## 2021-05-03 RX ORDER — HYDROCODONE BITARTRATE AND ACETAMINOPHEN 5; 325 MG/1; MG/1
1 TABLET ORAL EVERY 6 HOURS PRN
Qty: 84 TABLET | Refills: 0 | Status: SHIPPED | OUTPATIENT
Start: 2021-05-04 | End: 2021-08-03 | Stop reason: SDUPTHER

## 2021-05-03 NOTE — PROGRESS NOTES
"Subjective:      Honey Conde is a 65 y.o. female who presents with Follow-Up            HPI 1.  Seasonal allergies-patient notes approximately 3-week history of increasing nasal congestion associated with springtime hayfever.  She reported difficulty concentrating when she tried loratadine in the past.  Has never had Zyrtec.  2.  Cervical radiculopathy-patient continues to manage cervical and thoracic pain reasonably well using low-dose 5 mg of Norco 3 times a day.  She is not reporting any lost prescriptions, constipation, confusion.    ROS       Objective:     /58   Pulse 68   Temp 36.3 °C (97.3 °F) (Temporal)   Resp 16   Ht 1.626 m (5' 4.02\")   Wt 73.5 kg (162 lb)   LMP 03/10/2004   SpO2 92%   BMI 27.79 kg/m²      Physical Exam  Gen.- alert, cooperative, in no acute distress  Neck- midline trachea, thyroid not enlarged or tender,supple, no cervical adenopathy  Chest-clear to auscultation and percussion with normal breath sounds. No retractions. Chest wall nontender  Cardiac- regular rhythm and rate. No murmur, thrill, or heave  Nares-moderately swollen mucosa, light pink color without bleeding    Oropharynx-clear pink moist mucosa without swelling, redness or exudate. Tongue is midline.         Assessment/Plan:        1. Seasonal allergies    - cetirizine (ZYRTEC) 10 MG Tab; Take 1 tablet by mouth every day.  Dispense: 30 tablet; Refill: 6  - HYDROcodone-acetaminophen (NORCO) 5-325 MG Tab per tablet; Take 1 tablet by mouth every 6 hours as needed for up to 28 days.  Dispense: 84 tablet; Refill: 0    2. Cervical radiculopathy    - HYDROcodone-acetaminophen (NORCO) 5-325 MG Tab per tablet; Take 1 tablet by mouth every 6 hours as needed for up to 28 days.  Dispense: 84 tablet; Refill: 0  - HYDROcodone-acetaminophen (NORCO) 5-325 MG Tab per tablet; Take 1 tablet by mouth every 6 hours as needed for up to 28 days.  Dispense: 84 tablet; Refill: 0  - HYDROcodone-acetaminophen (NORCO) 5-325 MG Tab " per tablet; Take 1 tablet by mouth every 6 hours as needed for up to 28 days.  Dispense: 84 tablet; Refill: 0    3. Thoracic degenerative disc disease    - HYDROcodone-acetaminophen (NORCO) 5-325 MG Tab per tablet; Take 1 tablet by mouth every 6 hours as needed for up to 28 days.  Dispense: 84 tablet; Refill: 0  - HYDROcodone-acetaminophen (NORCO) 5-325 MG Tab per tablet; Take 1 tablet by mouth every 6 hours as needed for up to 28 days.  Dispense: 84 tablet; Refill: 0  Plan: 1.  Renew Norco 5/325 3 times daily, 20-day supply, 2 additional refill sent today  2.  Trial of Zyrtec 10 mg daily  3.  Discussion held on the COVID-19 vaccination  4.  Plan on revisit in 3 months sooner if needed

## 2021-07-28 ENCOUNTER — HOSPITAL ENCOUNTER (OUTPATIENT)
Dept: RADIOLOGY | Facility: MEDICAL CENTER | Age: 66
End: 2021-07-28
Attending: FAMILY MEDICINE
Payer: MEDICAID

## 2021-07-28 DIAGNOSIS — Z12.31 VISIT FOR SCREENING MAMMOGRAM: ICD-10-CM

## 2021-07-28 PROCEDURE — 77063 BREAST TOMOSYNTHESIS BI: CPT

## 2021-07-30 ENCOUNTER — TELEPHONE (OUTPATIENT)
Dept: MEDICAL GROUP | Facility: MEDICAL CENTER | Age: 66
End: 2021-07-30

## 2021-07-30 NOTE — TELEPHONE ENCOUNTER
Phone Number Called: [phone] (home)     Call outcome: Did not leave a detailed message. Requested patient to call back.    Message: Left voicemail to give us a call back in regards to her mammogram results.

## 2021-08-02 ENCOUNTER — TELEPHONE (OUTPATIENT)
Dept: MEDICAL GROUP | Facility: MEDICAL CENTER | Age: 66
End: 2021-08-02

## 2021-08-02 NOTE — TELEPHONE ENCOUNTER
Please let patient know that her mammogram was normal with no signs of malignancy.  Should be repeated in 1 year

## 2021-08-03 ENCOUNTER — OFFICE VISIT (OUTPATIENT)
Dept: MEDICAL GROUP | Facility: MEDICAL CENTER | Age: 66
End: 2021-08-03
Attending: FAMILY MEDICINE
Payer: MEDICAID

## 2021-08-03 VITALS
BODY MASS INDEX: 28 KG/M2 | TEMPERATURE: 97.7 F | SYSTOLIC BLOOD PRESSURE: 114 MMHG | RESPIRATION RATE: 16 BRPM | HEART RATE: 72 BPM | HEIGHT: 64 IN | WEIGHT: 164 LBS | DIASTOLIC BLOOD PRESSURE: 62 MMHG | OXYGEN SATURATION: 94 %

## 2021-08-03 DIAGNOSIS — J30.2 SEASONAL ALLERGIES: ICD-10-CM

## 2021-08-03 DIAGNOSIS — M54.12 CERVICAL RADICULOPATHY: ICD-10-CM

## 2021-08-03 DIAGNOSIS — M51.34 THORACIC DEGENERATIVE DISC DISEASE: ICD-10-CM

## 2021-08-03 PROCEDURE — 99213 OFFICE O/P EST LOW 20 MIN: CPT | Performed by: FAMILY MEDICINE

## 2021-08-03 RX ORDER — HYDROCODONE BITARTRATE AND ACETAMINOPHEN 5; 325 MG/1; MG/1
1 TABLET ORAL EVERY 6 HOURS PRN
Qty: 84 TABLET | Refills: 0 | Status: SHIPPED | OUTPATIENT
Start: 2021-09-28 | End: 2021-10-26

## 2021-08-03 RX ORDER — HYDROCODONE BITARTRATE AND ACETAMINOPHEN 5; 325 MG/1; MG/1
1 TABLET ORAL EVERY 6 HOURS PRN
Qty: 84 TABLET | Refills: 0 | Status: SHIPPED | OUTPATIENT
Start: 2021-08-03 | End: 2021-11-11 | Stop reason: SDUPTHER

## 2021-08-03 RX ORDER — HYDROCODONE BITARTRATE AND ACETAMINOPHEN 5; 325 MG/1; MG/1
1 TABLET ORAL EVERY 6 HOURS PRN
Qty: 84 TABLET | Refills: 0 | Status: SHIPPED | OUTPATIENT
Start: 2021-08-31 | End: 2021-09-28

## 2021-08-03 ASSESSMENT — PATIENT HEALTH QUESTIONNAIRE - PHQ9
1. LITTLE INTEREST OR PLEASURE IN DOING THINGS: NOT AT ALL
SUM OF ALL RESPONSES TO PHQ9 QUESTIONS 1 AND 2: 0
6. FEELING BAD ABOUT YOURSELF - OR THAT YOU ARE A FAILURE OR HAVE LET YOURSELF OR YOUR FAMILY DOWN: NOT AL ALL
2. FEELING DOWN, DEPRESSED, IRRITABLE, OR HOPELESS: NOT AT ALL
4. FEELING TIRED OR HAVING LITTLE ENERGY: NOT AT ALL
3. TROUBLE FALLING OR STAYING ASLEEP OR SLEEPING TOO MUCH: NOT AT ALL
7. TROUBLE CONCENTRATING ON THINGS, SUCH AS READING THE NEWSPAPER OR WATCHING TELEVISION: NOT AT ALL
5. POOR APPETITE OR OVEREATING: NOT AT ALL
8. MOVING OR SPEAKING SO SLOWLY THAT OTHER PEOPLE COULD HAVE NOTICED. OR THE OPPOSITE, BEING SO FIGETY OR RESTLESS THAT YOU HAVE BEEN MOVING AROUND A LOT MORE THAN USUAL: NOT AT ALL
SUM OF ALL RESPONSES TO PHQ QUESTIONS 1-9: 0
9. THOUGHTS THAT YOU WOULD BE BETTER OFF DEAD, OR OF HURTING YOURSELF: NOT AT ALL

## 2021-08-03 NOTE — PROGRESS NOTES
"Subjective:      Honey Conde is a 65 y.o. female who presents with Follow-Up            HPI 1.  Cervical thoracic radiculopathy-patient ports she continues to manage daily activities using Norco 5/325 3 times daily.  She reports average pain levels however do remain around 8-9 out of 10.  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  2.  Seasonal allergies-patient found that she was extremely sedated for trying the Zyrtec.  She had similar issues with Claritin.  She occasionally takes a as needed dose of Mucinex and feels that she is managing very well.  ROS negative for urinary incontinence, fecal incontinence, dyspnea       Objective:     /62 (BP Location: Left arm, Patient Position: Sitting)   Pulse 72   Temp 36.5 °C (97.7 °F) (Temporal)   Resp 16   Ht 1.626 m (5' 4.02\")   Wt 74.4 kg (164 lb)   LMP 03/10/2004   SpO2 94%   BMI 28.13 kg/m²      Physical Exam  Gen.- alert, cooperative, in no acute distress  Neck- midline trachea, thyroid not enlarged or tender,supple, no cervical adenopathy  Chest-clear to auscultation and percussion with normal breath sounds. No retractions. Chest wall nontender  Cardiac- regular rhythm and rate. No murmur, thrill, or heave                   Assessment/Plan:        1. Cervical radiculopathy  - HYDROcodone-acetaminophen (NORCO) 5-325 MG Tab per tablet; Take 1 tablet by mouth every 6 hours as needed for up to 28 days.  Dispense: 84 tablet; Refill: 0  - HYDROcodone-acetaminophen (NORCO) 5-325 MG Tab per tablet; Take 1 tablet by mouth every 6 hours as needed for up to 28 days.  Dispense: 84 tablet; Refill: 0  - HYDROcodone-acetaminophen (NORCO) 5-325 MG Tab per tablet; Take 1 tablet by mouth every 6 hours as needed for up to 28 days.  Dispense: 84 tablet; Refill: 0    2. Thoracic degenerative disc disease    - HYDROcodone-acetaminophen (NORCO) 5-325 MG Tab per tablet; Take 1 tablet by mouth every 6 hours as needed for up to 28 days.  " Dispense: 84 tablet; Refill: 0  - HYDROcodone-acetaminophen (NORCO) 5-325 MG Tab per tablet; Take 1 tablet by mouth every 6 hours as needed for up to 28 days.  Dispense: 84 tablet; Refill: 0  - HYDROcodone-acetaminophen (NORCO) 5-325 MG Tab per tablet; Take 1 tablet by mouth every 6 hours as needed for up to 28 days.  Dispense: 84 tablet; Refill: 0    3. Seasonal allergies    Plan: 1.  Norco 5/325 3 times daily for 28 days refilled with 2 subsequent refills  2.  Revisit in 3 weeks or sooner if needed  3.  Recent mammogram was benign

## 2021-11-11 ENCOUNTER — OFFICE VISIT (OUTPATIENT)
Dept: MEDICAL GROUP | Facility: MEDICAL CENTER | Age: 66
End: 2021-11-11
Attending: FAMILY MEDICINE
Payer: MEDICAID

## 2021-11-11 VITALS
SYSTOLIC BLOOD PRESSURE: 148 MMHG | HEART RATE: 71 BPM | TEMPERATURE: 97.5 F | DIASTOLIC BLOOD PRESSURE: 88 MMHG | BODY MASS INDEX: 27.49 KG/M2 | RESPIRATION RATE: 16 BRPM | HEIGHT: 64 IN | OXYGEN SATURATION: 100 % | WEIGHT: 161 LBS

## 2021-11-11 DIAGNOSIS — M62.830 LUMBAR PARASPINAL MUSCLE SPASM: ICD-10-CM

## 2021-11-11 DIAGNOSIS — M54.12 CERVICAL RADICULOPATHY: ICD-10-CM

## 2021-11-11 DIAGNOSIS — M51.34 THORACIC DEGENERATIVE DISC DISEASE: ICD-10-CM

## 2021-11-11 PROCEDURE — 99213 OFFICE O/P EST LOW 20 MIN: CPT | Performed by: FAMILY MEDICINE

## 2021-11-11 RX ORDER — HYDROCODONE BITARTRATE AND ACETAMINOPHEN 5; 325 MG/1; MG/1
1 TABLET ORAL EVERY 6 HOURS PRN
Qty: 84 TABLET | Refills: 0 | Status: SHIPPED | OUTPATIENT
Start: 2021-12-09 | End: 2022-02-14 | Stop reason: SDUPTHER

## 2021-11-11 RX ORDER — HYDROCODONE BITARTRATE AND ACETAMINOPHEN 5; 325 MG/1; MG/1
1 TABLET ORAL EVERY 6 HOURS PRN
Qty: 84 TABLET | Refills: 0 | Status: SHIPPED | OUTPATIENT
Start: 2022-01-06 | End: 2022-02-03

## 2021-11-11 RX ORDER — TIZANIDINE 4 MG/1
4 TABLET ORAL EVERY 6 HOURS PRN
Qty: 30 TABLET | Refills: 3 | Status: SHIPPED | OUTPATIENT
Start: 2021-11-11 | End: 2022-11-18 | Stop reason: SDUPTHER

## 2021-11-11 RX ORDER — HYDROCODONE BITARTRATE AND ACETAMINOPHEN 5; 325 MG/1; MG/1
1 TABLET ORAL EVERY 6 HOURS PRN
Qty: 84 TABLET | Refills: 0 | Status: SHIPPED | OUTPATIENT
Start: 2021-11-11 | End: 2021-12-09

## 2021-11-11 NOTE — PROGRESS NOTES
"Subjective     Honey Conde is a 66 y.o. female who presents with Medication Refill and Pain (Back)            HPI 1.  Recurrent lumbar spasms-patient reports that in the past 3 to 4 weeks she has had very regular almost daily occurrence of low back aches.  Sometimes these will develop first thing in the morning other times that may have come on later in the day.  She does have some altered physical responsibilities at work which may be contributing.  She will experience a severe tightness and almost muscular spasm that may last 20 minutes.  The rest of the day she reports there will be a much milder but still persistent level of tightness.  She does not recall any recent overuse beyond what is noted above.  2.  Chronic pain-patient continues to get moderate pain relief using Norco 5/325 typically 1-3 times daily depending on her activity level.  ROS negative for dysuria, urinary incontinence, fecal incontinence           Objective     /88 (BP Location: Left arm, Patient Position: Sitting, BP Cuff Size: Adult)   Pulse 71   Temp 36.4 °C (97.5 °F) (Temporal)   Resp 16   Ht 1.626 m (5' 4\")   Wt 73 kg (161 lb)   LMP 03/10/2004   SpO2 100%   BMI 27.64 kg/m²      Physical Exam  Gen.- alert, cooperative, in no acute distress  Neck- midline trachea, thyroid not enlarged or tender,supple, no cervical adenopathy  Chest-clear to auscultation and percussion with normal breath sounds. No retractions. Chest wall nontender  Cardiac- regular rhythm and rate. No murmur, thrill, or heave  Back-nontender to external palpation over the bony midline and paraspinous areas bilaterally                      Assessment & Plan        1. Cervical radiculopathy    - HYDROcodone-acetaminophen (NORCO) 5-325 MG Tab per tablet; Take 1 Tablet by mouth every 6 hours as needed for up to 28 days.  Dispense: 84 Tablet; Refill: 0  - HYDROcodone-acetaminophen (NORCO) 5-325 MG Tab per tablet; Take 1 Tablet by mouth every 6 hours as " needed for up to 28 days.  Dispense: 84 Tablet; Refill: 0  - HYDROcodone-acetaminophen (NORCO) 5-325 MG Tab per tablet; Take 1 Tablet by mouth every 6 hours as needed for up to 28 days.  Dispense: 84 Tablet; Refill: 0    2. Thoracic degenerative disc disease    - HYDROcodone-acetaminophen (NORCO) 5-325 MG Tab per tablet; Take 1 Tablet by mouth every 6 hours as needed for up to 28 days.  Dispense: 84 Tablet; Refill: 0  - HYDROcodone-acetaminophen (NORCO) 5-325 MG Tab per tablet; Take 1 Tablet by mouth every 6 hours as needed for up to 28 days.  Dispense: 84 Tablet; Refill: 0  - HYDROcodone-acetaminophen (NORCO) 5-325 MG Tab per tablet; Take 1 Tablet by mouth every 6 hours as needed for up to 28 days.  Dispense: 84 Tablet; Refill: 0    3. Lumbar paraspinal muscle spasm    - tizanidine (ZANAFLEX) 4 MG Tab; Take 1 Tablet by mouth every 6 hours as needed.  Dispense: 30 Tablet; Refill: 3    Plan: 1.  Several back stretching exercises were demonstrated and patient is asked to perform these daily  2.  Trial of tizanidine 4 mg as needed-drowsiness precautions reviewed  3.  Follow-up in 3 months or sooner as needed

## 2022-02-14 ENCOUNTER — OFFICE VISIT (OUTPATIENT)
Dept: MEDICAL GROUP | Facility: MEDICAL CENTER | Age: 67
End: 2022-02-14
Attending: FAMILY MEDICINE
Payer: MEDICAID

## 2022-02-14 VITALS
HEART RATE: 88 BPM | WEIGHT: 164.3 LBS | SYSTOLIC BLOOD PRESSURE: 128 MMHG | BODY MASS INDEX: 28.05 KG/M2 | RESPIRATION RATE: 20 BRPM | HEIGHT: 64 IN | OXYGEN SATURATION: 95 % | TEMPERATURE: 97.3 F | DIASTOLIC BLOOD PRESSURE: 64 MMHG

## 2022-02-14 DIAGNOSIS — M51.34 THORACIC DEGENERATIVE DISC DISEASE: ICD-10-CM

## 2022-02-14 DIAGNOSIS — M54.12 CERVICAL RADICULOPATHY: ICD-10-CM

## 2022-02-14 PROCEDURE — 99213 OFFICE O/P EST LOW 20 MIN: CPT | Performed by: FAMILY MEDICINE

## 2022-02-14 RX ORDER — HYDROCODONE BITARTRATE AND ACETAMINOPHEN 5; 325 MG/1; MG/1
1 TABLET ORAL EVERY 6 HOURS PRN
Qty: 84 TABLET | Refills: 0 | Status: SHIPPED | OUTPATIENT
Start: 2022-03-14 | End: 2022-04-11

## 2022-02-14 RX ORDER — HYDROCODONE BITARTRATE AND ACETAMINOPHEN 5; 325 MG/1; MG/1
1 TABLET ORAL EVERY 6 HOURS PRN
Qty: 84 TABLET | Refills: 0 | Status: SHIPPED | OUTPATIENT
Start: 2022-04-11 | End: 2022-05-09

## 2022-02-14 RX ORDER — HYDROCODONE BITARTRATE AND ACETAMINOPHEN 5; 325 MG/1; MG/1
1 TABLET ORAL EVERY 6 HOURS PRN
Qty: 84 TABLET | Refills: 0 | Status: SHIPPED | OUTPATIENT
Start: 2022-02-14 | End: 2022-05-16 | Stop reason: SDUPTHER

## 2022-02-14 ASSESSMENT — PATIENT HEALTH QUESTIONNAIRE - PHQ9: CLINICAL INTERPRETATION OF PHQ2 SCORE: 0

## 2022-02-14 NOTE — PROGRESS NOTES
"Subjective     Honey Conde is a 66 y.o. female who presents with Medication Refill            HPI 1.  Chronic pain-patient reports she continues to experience pain on a chronic basis more so on days that she works stemming from her neck, at times in her mid to lower back and her left foot.  She reports overall continued good pain relief using Norco 5/325 up to 3 times daily.    ROS negative for recent constipation, confusion, near syncope           Objective     /64 (BP Location: Left arm, Patient Position: Sitting, BP Cuff Size: Adult)   Pulse 88   Temp 36.3 °C (97.3 °F) (Temporal)   Resp 20   Ht 1.613 m (5' 3.5\")   Wt 74.5 kg (164 lb 4.8 oz)   LMP 03/10/2004   SpO2 95%   BMI 28.65 kg/m²      Physical Exam      Gen.- alert, cooperative, in no acute distress  Neck- midline trachea, thyroid not enlarged or tender,supple, no cervical adenopathy  Chest-clear to auscultation and percussion with normal breath sounds. No retractions. Chest wall nontender  Cardiac- regular rhythm and rate. No murmur, thrill, or heave  Neck-slightly tender to palpation at the posterior base at the C6-7 level bilaterally.  Back-nontender to palpation over the bony midline areas          PHQ 2 equals 0         Assessment & Plan        1. Cervical radiculopathy    - HYDROcodone-acetaminophen (NORCO) 5-325 MG Tab per tablet; Take 1 Tablet by mouth every 6 hours as needed for up to 28 days.  Dispense: 84 Tablet; Refill: 0  - HYDROcodone-acetaminophen (NORCO) 5-325 MG Tab per tablet; Take 1 Tablet by mouth every 6 hours as needed for up to 28 days.  Dispense: 84 Tablet; Refill: 0  - HYDROcodone-acetaminophen (NORCO) 5-325 MG Tab per tablet; Take 1 Tablet by mouth every 6 hours as needed for up to 28 days.  Dispense: 84 Tablet; Refill: 0    2. Thoracic degenerative disc disease    - HYDROcodone-acetaminophen (NORCO) 5-325 MG Tab per tablet; Take 1 Tablet by mouth every 6 hours as needed for up to 28 days.  Dispense: 84 " Tablet; Refill: 0  - HYDROcodone-acetaminophen (NORCO) 5-325 MG Tab per tablet; Take 1 Tablet by mouth every 6 hours as needed for up to 28 days.  Dispense: 84 Tablet; Refill: 0  - HYDROcodone-acetaminophen (NORCO) 5-325 MG Tab per tablet; Take 1 Tablet by mouth every 6 hours as needed for up to 28 days.  Dispense: 84 Tablet; Refill: 0    Plan: 1.  Renew Norco 5/325 up to 3 times daily, 28-day supply, for 3 months  2.  Follow-up with me in 3 months or sooner if needed

## 2022-05-16 ENCOUNTER — OFFICE VISIT (OUTPATIENT)
Dept: MEDICAL GROUP | Facility: MEDICAL CENTER | Age: 67
End: 2022-05-16
Attending: FAMILY MEDICINE
Payer: MEDICAID

## 2022-05-16 VITALS
SYSTOLIC BLOOD PRESSURE: 112 MMHG | RESPIRATION RATE: 16 BRPM | TEMPERATURE: 97.3 F | BODY MASS INDEX: 28.42 KG/M2 | DIASTOLIC BLOOD PRESSURE: 74 MMHG | OXYGEN SATURATION: 93 % | WEIGHT: 166.5 LBS | HEIGHT: 64 IN | HEART RATE: 80 BPM

## 2022-05-16 DIAGNOSIS — M54.12 CERVICAL RADICULOPATHY: ICD-10-CM

## 2022-05-16 DIAGNOSIS — D12.6 ADENOMATOUS POLYP OF COLON, UNSPECIFIED PART OF COLON: ICD-10-CM

## 2022-05-16 DIAGNOSIS — M51.34 THORACIC DEGENERATIVE DISC DISEASE: ICD-10-CM

## 2022-05-16 PROCEDURE — 99214 OFFICE O/P EST MOD 30 MIN: CPT | Performed by: FAMILY MEDICINE

## 2022-05-16 RX ORDER — HYDROCODONE BITARTRATE AND ACETAMINOPHEN 5; 325 MG/1; MG/1
1 TABLET ORAL EVERY 8 HOURS PRN
Qty: 84 TABLET | Refills: 0 | Status: SHIPPED | OUTPATIENT
Start: 2022-05-16 | End: 2022-08-16 | Stop reason: SDUPTHER

## 2022-05-16 RX ORDER — HYDROCODONE BITARTRATE AND ACETAMINOPHEN 5; 325 MG/1; MG/1
1 TABLET ORAL EVERY 8 HOURS PRN
Qty: 84 TABLET | Refills: 0 | Status: SHIPPED | OUTPATIENT
Start: 2022-07-11 | End: 2022-08-08

## 2022-05-16 RX ORDER — HYDROCODONE BITARTRATE AND ACETAMINOPHEN 5; 325 MG/1; MG/1
1 TABLET ORAL EVERY 8 HOURS PRN
Qty: 84 TABLET | Refills: 0 | Status: SHIPPED | OUTPATIENT
Start: 2022-06-13 | End: 2022-07-11

## 2022-05-16 NOTE — PROGRESS NOTES
"Subjective     Honey Conde is a 66 y.o. female who presents with Follow-Up (Med refills, colonoscopy referral )            HPI 1.  History of adenomatous polyps-patient reports that she has had intermittent thin ribbony stools over the past 3 years since she had a hysterectomy with a bladder suspension.  She is not seen any black or bloody stools.  She also had a colonoscopy 5 or 6 years ago with removal of at least 1 adenomatous polyp and was recommended to be restudied within 5 years.  At times she will feel like there is a sensation of fullness or lump somewhere up in her pelvic or rectal canal area nothing protrudes from the outside of the anus.  She has not had any vaginal prolapse.  2.  Chronic pain-patient requests renewal of her Norco 5/325 which she takes 3 times daily.  She is not reporting constipation, confusion or lost prescriptions.  ROS negative for fecal incontinence, urinary incontinence, dysuria, painful defecation           Objective     /74   Pulse 80   Temp 36.3 °C (97.3 °F) (Temporal)   Resp 16   Ht 1.613 m (5' 3.5\")   Wt 75.5 kg (166 lb 8 oz)   LMP 03/10/2004   SpO2 93%   BMI 29.03 kg/m²      Physical Exam  Gen.- alert, cooperative, in no acute distress  Neck- midline trachea, thyroid not enlarged or tender,supple, no cervical adenopathy  Chest-clear to auscultation and percussion with normal breath sounds. No retractions. Chest wall nontender  Cardiac- regular rhythm and rate. No murmur, thrill, or heave  Abdomen- normal bowel sounds, soft without guarding. Liver and spleen not enlarged, no palpable masses.  Mild indicated left mid abdominal tenderness with no palpable mass                      Assessment & Plan        1. Adenomatous polyp of colon, unspecified part of colon    - Referral to GI for Colonoscopy    2. Cervical radiculopathy    - HYDROcodone-acetaminophen (NORCO) 5-325 MG Tab per tablet; Take 1 Tablet by mouth as needed in the morning and 1 Tablet as needed " at noon and 1 Tablet as needed in the evening (Pain). Do all this for up to 28 days.  Dispense: 84 Tablet; Refill: 0  - HYDROcodone-acetaminophen (NORCO) 5-325 MG Tab per tablet; Take 1 Tablet by mouth as needed in the morning and 1 Tablet as needed at noon and 1 Tablet as needed in the evening (Pain). Do all this for up to 28 days.  Dispense: 84 Tablet; Refill: 0  - HYDROcodone-acetaminophen (NORCO) 5-325 MG Tab per tablet; Take 1 Tablet by mouth as needed in the morning and 1 Tablet as needed at noon and 1 Tablet as needed in the evening (Pain). Do all this for up to 28 days.  Dispense: 84 Tablet; Refill: 0    3. Thoracic degenerative disc disease    - HYDROcodone-acetaminophen (NORCO) 5-325 MG Tab per tablet; Take 1 Tablet by mouth as needed in the morning and 1 Tablet as needed at noon and 1 Tablet as needed in the evening (Pain). Do all this for up to 28 days.  Dispense: 84 Tablet; Refill: 0  - HYDROcodone-acetaminophen (NORCO) 5-325 MG Tab per tablet; Take 1 Tablet by mouth as needed in the morning and 1 Tablet as needed at noon and 1 Tablet as needed in the evening (Pain). Do all this for up to 28 days.  Dispense: 84 Tablet; Refill: 0  - HYDROcodone-acetaminophen (NORCO) 5-325 MG Tab per tablet; Take 1 Tablet by mouth as needed in the morning and 1 Tablet as needed at noon and 1 Tablet as needed in the evening (Pain). Do all this for up to 28 days.  Dispense: 84 Tablet; Refill: 0    Plan: 1.  Renew Norco 5/325, number 84 x 3 months  2.  Gastroenterology referral for colonoscopy   3.  Follow-up with me in 3 months

## 2022-08-16 ENCOUNTER — OFFICE VISIT (OUTPATIENT)
Dept: MEDICAL GROUP | Facility: MEDICAL CENTER | Age: 67
End: 2022-08-16
Attending: FAMILY MEDICINE
Payer: MEDICAID

## 2022-08-16 VITALS
BODY MASS INDEX: 27.91 KG/M2 | WEIGHT: 163.5 LBS | RESPIRATION RATE: 17 BRPM | OXYGEN SATURATION: 94 % | DIASTOLIC BLOOD PRESSURE: 72 MMHG | SYSTOLIC BLOOD PRESSURE: 138 MMHG | HEIGHT: 64 IN | TEMPERATURE: 96.5 F | HEART RATE: 70 BPM

## 2022-08-16 DIAGNOSIS — Z00.00 HEALTHCARE MAINTENANCE: ICD-10-CM

## 2022-08-16 DIAGNOSIS — M54.12 CERVICAL RADICULOPATHY: ICD-10-CM

## 2022-08-16 DIAGNOSIS — M51.34 THORACIC DEGENERATIVE DISC DISEASE: ICD-10-CM

## 2022-08-16 PROCEDURE — 99213 OFFICE O/P EST LOW 20 MIN: CPT | Performed by: FAMILY MEDICINE

## 2022-08-16 RX ORDER — HYDROCODONE BITARTRATE AND ACETAMINOPHEN 5; 325 MG/1; MG/1
1 TABLET ORAL EVERY 6 HOURS PRN
Qty: 84 TABLET | Refills: 0 | Status: SHIPPED | OUTPATIENT
Start: 2022-09-16 | End: 2022-10-14

## 2022-08-16 RX ORDER — HYDROCODONE BITARTRATE AND ACETAMINOPHEN 5; 325 MG/1; MG/1
1 TABLET ORAL EVERY 8 HOURS PRN
Qty: 84 TABLET | Refills: 0 | Status: SHIPPED | OUTPATIENT
Start: 2022-08-16 | End: 2022-09-13

## 2022-08-16 RX ORDER — HYDROCODONE BITARTRATE AND ACETAMINOPHEN 5; 325 MG/1; MG/1
1 TABLET ORAL EVERY 6 HOURS PRN
Qty: 84 TABLET | Refills: 0 | Status: SHIPPED | OUTPATIENT
Start: 2022-10-14 | End: 2022-11-11

## 2022-08-16 NOTE — PROGRESS NOTES
"Subjective     Honey Conde is a 66 y.o. female who presents with No chief complaint on file.            HPI 1.  Healthcare maintenance-patient is updating her colonoscopy tomorrow, and mammogram in 3 days.  2.  Cervical radiculopathy/thoracic pain-patient has ongoing neck and upper back pain.  She is working at a convenience store 5 days/week, 5 hours/day.  This generates pain in her neck and upper back which is mostly controlled with Norco 5/325 taken 3 times daily.  Patient has been very consistent and careful with her use not reporting any lost prescriptions or early refills.  On days when she does not work she has less pain and actually takes fewer doses.    ROS negative for constipation, black or bloody stools, unexplained weight loss           Objective     /72 (BP Location: Left arm, Patient Position: Sitting, BP Cuff Size: Adult)   Pulse 70   Temp 35.8 °C (96.5 °F) (Skin)   Resp 17   Ht 1.626 m (5' 4\")   Wt 74.2 kg (163 lb 8 oz)   LMP 03/10/2004   SpO2 94%   BMI 28.06 kg/m²      Physical Exam  Gen.- alert, cooperative, in no acute distress  Neck- midline trachea, thyroid not enlarged or tender,supple, no cervical adenopathy  Chest-clear to auscultation and percussion with normal breath sounds. No retractions. Chest wall nontender  Cardiac- regular rhythm and rate. No murmur, thrill, or heave  Back-mild tenderness to palpation over the lower cervical midline and upper thoracic midline.                      Assessment & Plan        1. Healthcare maintenance    - Comp Metabolic Panel; Future    2. Cervical radiculopathy    - HYDROcodone-acetaminophen (NORCO) 5-325 MG Tab per tablet; Take 1 Tablet by mouth every 8 hours as needed (Pain) for up to 28 days.  Dispense: 84 Tablet; Refill: 0  - HYDROcodone-acetaminophen (NORCO) 5-325 MG Tab per tablet; Take 1 Tablet by mouth every 6 hours as needed (Moderate pain) for up to 28 days.  Dispense: 84 Tablet; Refill: 0  - HYDROcodone-acetaminophen " (NORCO) 5-325 MG Tab per tablet; Take 1 Tablet by mouth every 6 hours as needed (Moderate pain) for up to 28 days.  Dispense: 84 Tablet; Refill: 0    3. Thoracic degenerative disc disease    - HYDROcodone-acetaminophen (NORCO) 5-325 MG Tab per tablet; Take 1 Tablet by mouth every 8 hours as needed (Pain) for up to 28 days.  Dispense: 84 Tablet; Refill: 0  - HYDROcodone-acetaminophen (NORCO) 5-325 MG Tab per tablet; Take 1 Tablet by mouth every 6 hours as needed (Moderate pain) for up to 28 days.  Dispense: 84 Tablet; Refill: 0  - HYDROcodone-acetaminophen (NORCO) 5-325 MG Tab per tablet; Take 1 Tablet by mouth every 6 hours as needed (Moderate pain) for up to 28 days.  Dispense: 84 Tablet; Refill: 0    Plan: 1.  Renew Norco 5/325, #84 (28-day prescription), 2 refills  2.  Patient will establish with new provider within 3 months

## 2022-08-19 ENCOUNTER — HOSPITAL ENCOUNTER (OUTPATIENT)
Dept: RADIOLOGY | Facility: MEDICAL CENTER | Age: 67
End: 2022-08-19
Attending: FAMILY MEDICINE
Payer: MEDICAID

## 2022-08-19 DIAGNOSIS — Z12.31 VISIT FOR SCREENING MAMMOGRAM: ICD-10-CM

## 2022-08-19 PROCEDURE — 77063 BREAST TOMOSYNTHESIS BI: CPT

## 2022-08-23 ENCOUNTER — TELEPHONE (OUTPATIENT)
Dept: MEDICAL GROUP | Facility: MEDICAL CENTER | Age: 67
End: 2022-08-23
Payer: MEDICAID

## 2022-08-23 NOTE — TELEPHONE ENCOUNTER
VOICEMAIL  1. Caller Name: Honey Conde                        Call Back Number: [phone] (home)       2. Message: left Vm to call back and be informed in regards results      3. Patient approves office to leave a detailed voicemail/MyChart message: yes  ----- Message from Dave Bobby M.D. sent at 8/23/2022  7:42 AM PDT -----  Mammogram results are normal. Please repeat exam in 1 year.

## 2022-08-29 DIAGNOSIS — J30.2 SEASONAL ALLERGIES: ICD-10-CM

## 2022-09-05 RX ORDER — CETIRIZINE HYDROCHLORIDE 10 MG/1
TABLET ORAL
Qty: 30 TABLET | Refills: 6 | Status: SHIPPED | OUTPATIENT
Start: 2022-09-05

## 2022-11-18 ENCOUNTER — OFFICE VISIT (OUTPATIENT)
Dept: MEDICAL GROUP | Facility: MEDICAL CENTER | Age: 67
End: 2022-11-18
Attending: FAMILY MEDICINE
Payer: MEDICARE

## 2022-11-18 VITALS
OXYGEN SATURATION: 92 % | DIASTOLIC BLOOD PRESSURE: 68 MMHG | BODY MASS INDEX: 28.85 KG/M2 | WEIGHT: 169 LBS | SYSTOLIC BLOOD PRESSURE: 142 MMHG | TEMPERATURE: 97.2 F | HEIGHT: 64 IN | RESPIRATION RATE: 16 BRPM | HEART RATE: 76 BPM

## 2022-11-18 DIAGNOSIS — M51.34 THORACIC DEGENERATIVE DISC DISEASE: ICD-10-CM

## 2022-11-18 DIAGNOSIS — Z13.79 GENETIC SCREENING: ICD-10-CM

## 2022-11-18 DIAGNOSIS — M79.672 CHRONIC FOOT PAIN, LEFT: ICD-10-CM

## 2022-11-18 DIAGNOSIS — G89.29 CHRONIC FOOT PAIN, LEFT: ICD-10-CM

## 2022-11-18 DIAGNOSIS — M54.2 CERVICALGIA: ICD-10-CM

## 2022-11-18 DIAGNOSIS — Z23 NEED FOR VACCINATION: ICD-10-CM

## 2022-11-18 DIAGNOSIS — Z87.891 HISTORY OF TOBACCO ABUSE: ICD-10-CM

## 2022-11-18 DIAGNOSIS — M54.12 CERVICAL RADICULOPATHY: ICD-10-CM

## 2022-11-18 DIAGNOSIS — D12.6 ADENOMATOUS POLYP OF COLON, UNSPECIFIED PART OF COLON: ICD-10-CM

## 2022-11-18 DIAGNOSIS — Z78.0 ASYMPTOMATIC MENOPAUSE: ICD-10-CM

## 2022-11-18 DIAGNOSIS — Z79.891 CHRONIC USE OF OPIATE DRUG FOR THERAPEUTIC PURPOSE: ICD-10-CM

## 2022-11-18 DIAGNOSIS — M62.830 LUMBAR PARASPINAL MUSCLE SPASM: ICD-10-CM

## 2022-11-18 DIAGNOSIS — E78.5 DYSLIPIDEMIA: ICD-10-CM

## 2022-11-18 PROBLEM — K62.5 RECTAL BLEEDING: Status: RESOLVED | Noted: 2017-01-16 | Resolved: 2022-11-18

## 2022-11-18 PROCEDURE — 99214 OFFICE O/P EST MOD 30 MIN: CPT | Mod: 25 | Performed by: FAMILY MEDICINE

## 2022-11-18 PROCEDURE — 90715 TDAP VACCINE 7 YRS/> IM: CPT

## 2022-11-18 PROCEDURE — 99213 OFFICE O/P EST LOW 20 MIN: CPT | Performed by: FAMILY MEDICINE

## 2022-11-18 RX ORDER — GABAPENTIN 100 MG/1
100-300 CAPSULE ORAL 3 TIMES DAILY PRN
Qty: 90 CAPSULE | Refills: 1 | Status: SHIPPED | OUTPATIENT
Start: 2022-11-18

## 2022-11-18 RX ORDER — HYDROCODONE BITARTRATE AND ACETAMINOPHEN 5; 325 MG/1; MG/1
1 TABLET ORAL 3 TIMES DAILY PRN
Qty: 80 TABLET | Refills: 0 | Status: SHIPPED | OUTPATIENT
Start: 2022-11-18 | End: 2022-12-18

## 2022-11-18 RX ORDER — TIZANIDINE 4 MG/1
4 TABLET ORAL
Qty: 90 TABLET | Refills: 1 | Status: SHIPPED | OUTPATIENT
Start: 2022-11-18

## 2022-11-18 ASSESSMENT — PATIENT HEALTH QUESTIONNAIRE - PHQ9: CLINICAL INTERPRETATION OF PHQ2 SCORE: 0

## 2022-11-18 NOTE — PROGRESS NOTES
Subjective:     CC:    Chief Complaint   Patient presents with    Establish Care       HISTORY OF THE PRESENT ILLNESS: Patient is a 67 y.o. female. This pleasant patient is here today to establish care and discuss the following issues.   His/her prior PCP was Dr. Dave Bobby.    Specialists - none    Chronic foot pain, left  History of trauma of left ankle s/p orthopedic surgery     Cervical radiculopathy  Chronic pain of the neck, radiates down the shoulder, sometimes gets numbness of the left arm, which has been more frequent than in the past   Weakness of the arms and hands b/l, weakness in the hands is a little worse than before   Works in a kitchen   No numbness/weakness/tingling of the legs   No falls   Abnormal MRI of the neck in 2015  She is on norco 5mg TID     Chronic use of opiate drug for therapeutic purpose  Patient takes the pain medication for left ankle pain and also neck pain    Analgesia: relieves 90% of her pain  Activity Level: somewhat   Adverse Effects: none  Aberrant Behavior: none  Affect and Mood: none    History of pain: 2015 cervical spine MRI showing facet arthropathy, mild central canal stenosis. Noted pain radiating to arms with tingling.   Significant trauma of the left ankle in the past with orthopedic intervention with chronic pain.   PHQ9: 0  Prior imaging: C spine Mri 2015   Prior treatments: no injections, never done PT, no other medications. Takes 650mg tylenol occasionally. tizanadine nightly for pain.  Response to prior treatments: n/a    Any CS last dose: 6 hours ago   Last dose of currently prescribed meds: 6 hours ago   Frequency: 2-3 times per day   Current MME: 15      Opioid Risk Score: 10    Most recent UDS reviewed today and is consistent with prescribed medications.    I have reviewed the medical records, the Prescription Monitoring Program and I have determined that controlled substance treatment is medically indicated.             Allergies: Ceclor [cefaclor],  Erythromycin, Kiwi extract, and Pcn [penicillins]    Current Outpatient Medications Ordered in Epic   Medication Sig Dispense Refill    tizanidine (ZANAFLEX) 4 MG Tab Take 1 Tablet by mouth 1 time a day as needed (pain). 90 Tablet 1    gabapentin (NEURONTIN) 100 MG Cap Take 1-3 Capsules by mouth 3 times a day as needed (pain). 90 Capsule 1    HYDROcodone-acetaminophen (NORCO) 5-325 MG Tab per tablet Take 1 Tablet by mouth 3 times a day as needed (severe pain) for up to 30 days. 80 Tablet 0    cetirizine (ZYRTEC) 10 MG Tab TAKE ONE TABLET BY MOUTH DAILY 30 Tablet 6     No current Epic-ordered facility-administered medications on file.       Past Medical History:   Diagnosis Date    Anxiety     Arthritis     neck and back    Bone spur     in R side of neck    Bowel habit changes     constipation and diarrhea    Depression     Gynecological disorder     Heart burn     Hiatus hernia syndrome     Snoring     Urinary incontinence        Past Surgical History:   Procedure Laterality Date    VAGINAL HYSTERECTOMY TOTAL N/A 6/21/2018    Procedure: VAGINAL HYSTERECTOMY TOTAL;  Surgeon: Von Reagan M.D.;  Location: SURGERY SAME DAY [facility];  Service: Gynecology    ANTERIOR AND POSTERIOR REPAIR N/A 6/21/2018    Procedure: ANTERIOR AND POSTERIOR REPAIR - FOR ANTERIOR COLPORRAPHY AND POSTERIOR COLPO PERINEORRAPHY;  Surgeon: Von Reagan M.D.;  Location: SURGERY SAME DAY [facility];  Service: Gynecology    VAGINAL SUSPENSION N/A 6/21/2018    Procedure: VAGINAL SUSPENSION - POSS UTEROSACRAL VAULT;  Surgeon: Von Reagan M.D.;  Location: SURGERY SAME DAY [facility];  Service: Gynecology    COLONOSCOPY  5/19/17    single tubular adenoma    BREAST BIOPSY Right 2010    Benign    ORIF, ANKLE Left 2003    ORIF    TUBAL LIGATION  1993    OTHER ORTHOPEDIC SURGERY      L ankle    US-NEEDLE CORE BX-BREAST PANEL         Social History     Tobacco Use    Smoking status: Light Smoker     Packs/day: 1.00     Years:  "40.00     Pack years: 40.00     Types: Cigarettes     Last attempt to quit: 2015     Years since quittin.9    Smokeless tobacco: Never    Tobacco comments:     vapes   Vaping Use    Vaping Use: Every day    Substances: Nicotine   Substance Use Topics    Alcohol use: Not Currently     Comment: none, history of unhealthy drinking habits, 2drinks per day, stopped 10 years ago    Drug use: Not Currently     Types: Methamphetamines     Comment: stopped 15 years ago       Social History     Social History Narrative    Not on file       Family History   Problem Relation Age of Onset    Breast Cancer Mother     Cancer Mother 76        breast    Heart Disease Maternal Uncle     Diabetes Maternal Grandmother     Stroke Neg Hx                Objective:     Exam: BP (!) 142/68   Pulse 76   Temp 36.2 °C (97.2 °F)   Resp 16   Ht 1.626 m (5' 4.02\")   Wt 76.7 kg (169 lb)   SpO2 92%  Body mass index is 28.99 kg/m².    General: Normal appearing. No distress.  Neck: Supple. Thyroid is not enlarged.  Neurologic: positive spurling on the left side with radiation of pain down the left arm   Lymph: No cervical or supraclavicular lymph nodes are palpable  Skin: Warm and dry.  No obvious lesions.  Musculoskeletal: Normal gait. No extremity cyanosis, clubbing, or edema.  Psych: Normal mood and affect. Alert and oriented x3. Judgment and insight is normal.      Labs:   Last labs 2018  Mildly decreased GFR, otherwise cbc/bmp normal  Reviewed MRIs C spine/T spine in  and 2018     Assessment & Plan:   67 y.o. female with the following -    1. Chronic use of opiate drug for therapeutic purpose  - CBC WITHOUT DIFFERENTIAL; Future  - Comp Metabolic Panel; Future  - HYDROcodone-acetaminophen (NORCO) 5-325 MG Tab per tablet; Take 1 Tablet by mouth 3 times a day as needed (severe pain) for up to 30 days.  Dispense: 80 Tablet; Refill: 0  Multiple reasons for opiate use, mostly for cervical radiculopathy, MRI showing mild cervical " stenosis and also facet arthrosis. Pt could potentially benefit from interventional pain management  Since patient has had changes in her symptoms over the last few years and imaging of the c spine has not been since 2015! We will repeat imaging and also in preparation for interventional pain mgmt. Her physical exam is consistent with cervical radiculopathy.   Discussed goal to minimize/eliminate opiates  She has never tried any other medication management  She can try tylenol, consider ibuprofen after completion of labs.   Also starting gabapentin to see if it might help with pain  Already taking tizanidine    2. Cervical radiculopathy  - Referral to Pain Clinic  - gabapentin (NEURONTIN) 100 MG Cap; Take 1-3 Capsules by mouth 3 times a day as needed (pain).  Dispense: 90 Capsule; Refill: 1  - HYDROcodone-acetaminophen (NORCO) 5-325 MG Tab per tablet; Take 1 Tablet by mouth 3 times a day as needed (severe pain) for up to 30 days.  Dispense: 80 Tablet; Refill: 0  Per #1    3. Thoracic degenerative disc disease  - Controlled Substance Treatment Agreement  - Pain Mangement Panel, Screen W/ RFLX TO QN; Future  Per #1    4. Cervicalgia  - MR-CERVICAL SPINE-W/O; Future  - gabapentin (NEURONTIN) 100 MG Cap; Take 1-3 Capsules by mouth 3 times a day as needed (pain).  Dispense: 90 Capsule; Refill: 1    5. Chronic foot pain, left  - HYDROcodone-acetaminophen (NORCO) 5-325 MG Tab per tablet; Take 1 Tablet by mouth 3 times a day as needed (severe pain) for up to 30 days.  Dispense: 80 Tablet; Refill: 0  Will need to consider f/u xrays, possibly send to ortho for potential intervention to help improve pain    6. Dyslipidemia  - Lipid Profile; Future    7. Genetic screening  - Referral to Genetic Research Studies  FH of breast Ca    8. History of tobacco abuse  - REFERRAL TO LUNG CANCER SCREENING PROGRAM    9. Asymptomatic menopause  - DS-BONE DENSITY STUDY (DEXA); Future    10. Lumbar paraspinal muscle spasm  - tizanidine  (ZANAFLEX) 4 MG Tab; Take 1 Tablet by mouth 1 time a day as needed (pain).  Dispense: 90 Tablet; Refill: 1    11. Adenomatous polyp of colon, unspecified part of colon  - CBC WITHOUT DIFFERENTIAL; Future    12. Need for vaccination  - Tdap =>8yo IM      Return in about 1 month (around 12/18/2022).    Please note that this dictation was created using voice recognition software. I have made every reasonable attempt to correct obvious errors, but I expect that there are errors of grammar and possibly content that I did not discover before finalizing the note.

## 2022-11-18 NOTE — ASSESSMENT & PLAN NOTE
Chronic pain of the neck, radiates down the shoulder, sometimes gets numbness of the left arm, which has been more frequent than in the past   Weakness of the arms and hands b/l, weakness in the hands is a little worse than before   Works in a kitchen   No numbness/weakness/tingling of the legs   No falls   Abnormal MRI of the neck in 2015  She is on norco 5mg TID

## 2022-11-18 NOTE — ASSESSMENT & PLAN NOTE
Patient takes the pain medication for left ankle pain and also neck pain    Analgesia: relieves 90% of her pain  Activity Level: somewhat   Adverse Effects: none  Aberrant Behavior: none  Affect and Mood: none    History of pain: 2015 cervical spine MRI showing facet arthropathy, mild central canal stenosis. Noted pain radiating to arms with tingling.   Significant trauma of the left ankle in the past with orthopedic intervention with chronic pain.   PHQ9: 0  Prior imaging: C spine Mri 2015   Prior treatments: no injections, never done PT, no other medications. Takes 650mg tylenol occasionally. tizanadine nightly for pain.  Response to prior treatments: n/a    Any CS last dose: 6 hours ago   Last dose of currently prescribed meds: 6 hours ago   Frequency: 2-3 times per day   Current MME: 15      Opioid Risk Score: 10    Most recent UDS reviewed today and is consistent with prescribed medications.    I have reviewed the medical records, the Prescription Monitoring Program and I have determined that controlled substance treatment is medically indicated.

## 2022-11-21 ENCOUNTER — TELEPHONE (OUTPATIENT)
Dept: HEMATOLOGY ONCOLOGY | Facility: MEDICAL CENTER | Age: 67
End: 2022-11-21
Payer: MEDICARE

## 2022-11-21 NOTE — TELEPHONE ENCOUNTER
Received referral to lung cancer screening program.  Chart review to assess for lung cancer screening program eligibility.   1. Age 50-80 yrs of age? Yes 67 y.o.  2. 20 pack year hx of smoking, or greater? Yes 1 [id number]= [id number] hx  3. Current smoker or if quit, has pt quit within last 15 yrs?Yes  Quit 2015  4. Any signs or symptoms of lung cancer? None noted  5. Previous history of lung cancer? None noted  6. Chest CT within past 12 mos.? None noted  Patient does meet eligibility criteria. [facility] scheduling notified to schedule the shared decision making visit.

## 2022-11-28 ENCOUNTER — RESEARCH ENCOUNTER (OUTPATIENT)
Dept: MEDICAL GROUP | Facility: PHYSICIAN GROUP | Age: 67
End: 2022-11-28
Attending: FAMILY MEDICINE
Payer: MEDICARE

## 2022-11-28 DIAGNOSIS — Z00.6 RESEARCH STUDY PATIENT: ICD-10-CM

## 2022-12-16 ENCOUNTER — HOSPITAL ENCOUNTER (OUTPATIENT)
Dept: HEMATOLOGY ONCOLOGY | Facility: MEDICAL CENTER | Age: 67
End: 2022-12-16
Attending: NURSE PRACTITIONER
Payer: MEDICARE

## 2022-12-16 VITALS
HEART RATE: 79 BPM | WEIGHT: 171.6 LBS | BODY MASS INDEX: 29.3 KG/M2 | SYSTOLIC BLOOD PRESSURE: 130 MMHG | RESPIRATION RATE: 14 BRPM | TEMPERATURE: 98.5 F | OXYGEN SATURATION: 94 % | DIASTOLIC BLOOD PRESSURE: 68 MMHG | HEIGHT: 64 IN

## 2022-12-16 DIAGNOSIS — Z87.891 PERSONAL HISTORY OF TOBACCO USE, PRESENTING HAZARDS TO HEALTH: ICD-10-CM

## 2022-12-16 PROCEDURE — G0296 VISIT TO DETERM LDCT ELIG: HCPCS | Performed by: NURSE PRACTITIONER

## 2022-12-16 ASSESSMENT — ENCOUNTER SYMPTOMS
WEIGHT LOSS: 0
SHORTNESS OF BREATH: 0
COUGH: 0
SPUTUM PRODUCTION: 0
HEMOPTYSIS: 0
WHEEZING: 0

## 2022-12-16 NOTE — PROGRESS NOTES
"Subjective     Honey Conde is a 67 y.o. female who presents with Lung Cancer Screening Program Prescreen ([facility]/History of tobacco abuse/BERNIE OBANDO)            HPI  Patient seen today for initial lung cancer screening visit. Patient referred by PCP, Dr. Bernie Obando, for lung cancer screening shared decision making visit.    The patient meets eligibility criteria including age, smoking history (30+ pack years), if former smoker, quit in the last 15 years, and absence of signs or symptoms of lung cancer.    - Age - 67  - Smoking history - Patient has smoked for 47 years at an average of 1 ppd = 47 pack year smoking history.  - Current smoking status - former smoker, quit 2015  - No symptoms of lung cancer and no previous history of lung cancer     Review of Systems   Constitutional:  Negative for malaise/fatigue and weight loss.   Respiratory:  Negative for cough, hemoptysis, sputum production, shortness of breath and wheezing.      Allergies   Allergen Reactions    Ceclor [Cefaclor] Hives    Erythromycin Swelling    Kiwi Extract     Pcn [Penicillins]        Current Outpatient Medications on File Prior to Encounter   Medication Sig Dispense Refill    tizanidine (ZANAFLEX) 4 MG Tab Take 1 Tablet by mouth 1 time a day as needed (pain). 90 Tablet 1    HYDROcodone-acetaminophen (NORCO) 5-325 MG Tab per tablet Take 1 Tablet by mouth 3 times a day as needed (severe pain) for up to 30 days. 80 Tablet 0    cetirizine (ZYRTEC) 10 MG Tab TAKE ONE TABLET BY MOUTH DAILY 30 Tablet 6    gabapentin (NEURONTIN) 100 MG Cap Take 1-3 Capsules by mouth 3 times a day as needed (pain). (Patient not taking: Reported on 12/16/2022) 90 Capsule 1     No current facility-administered medications on file prior to encounter.              Objective     /68 (BP Location: Left arm, Patient Position: Sitting, BP Cuff Size: Adult)   Pulse 79   Temp 36.9 °C (98.5 °F) (Temporal)   Resp 14   Ht 1.626 m (5' 4.02\")   Wt 77.8 kg " (171 lb 9.6 oz)   LMP 03/10/2004   SpO2 94%   BMI 29.44 kg/m²      Physical Exam  Vitals reviewed.   Constitutional:       General: She is not in acute distress.     Appearance: She is well-developed. She is not diaphoretic.   Cardiovascular:      Rate and Rhythm: Normal rate and regular rhythm.      Heart sounds: Normal heart sounds. No murmur heard.    No friction rub. No gallop.   Pulmonary:      Effort: Pulmonary effort is normal. No respiratory distress.      Breath sounds: Normal breath sounds. No wheezing.   Musculoskeletal:         General: Normal range of motion.   Skin:     General: Skin is warm and dry.   Neurological:      Mental Status: She is alert and oriented to person, place, and time.            Assessment & Plan   1. Personal history of tobacco use, presenting hazards to health  CT-LUNG CANCER-SCREENING            We conducted a shared decision-making process using a decision aid. We reviewed benefits and harms of screening, including false positives and potential need for additional diagnostic testing, the possibility of over diagnosis, and total radiation exposure.    We discussed the importance of adhering to annual LDCT screening. We also discussed the impact of comorbities on the patient's the ability or willingness to undergo diagnostic procedure(s) and treatment.    Counseling on the importance of maintaining cigarette smoking abstinence if former smoker; or the importance of smoking cessation if current smoker and, if appropriate, furnishing of information about tobacco cessation interventions.    Based on our discussion, we have decided to begin annual lung cancer screening starting now.      Insurance is changing 1/1/23, forward results to new [facility] PCP if applicable

## 2023-01-08 LAB
APOB+LDLR+PCSK9 GENE MUT ANL BLD/T: NOT DETECTED
BRCA1+BRCA2 DEL+DUP + FULL MUT ANL BLD/T: NOT DETECTED
MLH1+MSH2+MSH6+PMS2 GN DEL+DUP+FUL M: NOT DETECTED

## (undated) DEVICE — Device

## (undated) DEVICE — HEAD HOLDER JUNIOR/ADULT

## (undated) DEVICE — SUTURE 0 COATED VICRYL 6-18IN - (12PK/BX)

## (undated) DEVICE — MASK ANESTHESIA ADULT  - (100/CA)

## (undated) DEVICE — SODIUM CHL IRRIGATION 0.9% 1000ML (12EA/CA)

## (undated) DEVICE — LACTATED RINGERS INJ 1000 ML - (14EA/CA 60CA/PF)

## (undated) DEVICE — SENSOR SPO2 NEO LNCS ADHESIVE (20/BX) SEE USER NOTES

## (undated) DEVICE — SUTURE 0 VICRYL PLUS CT-1 - 8 X 18 INCH (12/BX)

## (undated) DEVICE — CATHETER IV 20 GA X 1-1/4 ---SURG.& SDS ONLY--- (50EA/BX)

## (undated) DEVICE — TUBING CLEARLINK DUO-VENT - C-FLO (48EA/CA)

## (undated) DEVICE — SET EXTENSION WITH 2 PORTS (48EA/CA) ***PART #2C8610 IS A SUBSTITUTE*****

## (undated) DEVICE — ELECTRODE 850 FOAM ADHESIVE - HYDROGEL RADIOTRNSPRNT (50/PK)

## (undated) DEVICE — SUTURE 3-0 VICRYL PLUS CT-1 - 36 INCH (36/BX)

## (undated) DEVICE — TUBE CONNECTING SUCTION - CLEAR PLASTIC STERILE 72 IN (50EA/CA)

## (undated) DEVICE — CANISTER SUCTION 3000ML MECHANICAL FILTER AUTO SHUTOFF MEDI-VAC NONSTERILE LF DISP  (40EA/CA)

## (undated) DEVICE — PAD SANITARY 11IN MAXI IND WRAPPED  (12EA/PK 24PK/CA)

## (undated) DEVICE — TRAY SRGPRP PVP IOD WT PRP - (20/CA)

## (undated) DEVICE — KIT SURGIFLO W/OUT THROMBIN - (6EA/CA)

## (undated) DEVICE — CANISTER SUCTION RIGID RED 1500CC (40EA/CA)

## (undated) DEVICE — TUBE E-T HI-LO CUFF 7.0MM (10EA/PK)

## (undated) DEVICE — TRAY FOLEY CATHETER STATLOCK 16FR SURESTEP  (10EA/CA)

## (undated) DEVICE — KIT ANESTHESIA W/CIRCUIT & 3/LT BAG W/FILTER (20EA/CA)

## (undated) DEVICE — ELECTRODE DUAL RETURN W/ CORD - (50/PK)

## (undated) DEVICE — SET IRRIGATION CYSTOSCOPY TUBE L80 IN (20EA/CA)

## (undated) DEVICE — SPONGE RADIOPAQUE CTN X-LG - STERILE (50PK/CA) MADE TO ORDER ITEM AND HAS A 4-6 WEEK LEAD TIME

## (undated) DEVICE — DRAPE VAGINAL BIB W/ POUCH (10EA/CA)

## (undated) DEVICE — SUTURE GENERAL

## (undated) DEVICE — PAD BABY LAP 4X18 W/O - RINGS PREWASHED 5/PK 40PK/CS

## (undated) DEVICE — PACKING VAG 2 X-RAY (24EA/CS)

## (undated) DEVICE — KIT  I.V. START (100EA/CA)

## (undated) DEVICE — NEPTUNE 4 PORT MANIFOLD - (20/PK)

## (undated) DEVICE — WATER IRRIGATION STERILE 1000ML (12EA/CA)

## (undated) DEVICE — SET LEADWIRE 5 LEAD BEDSIDE DISPOSABLE ECG (1SET OF 5/EA)

## (undated) DEVICE — GLOVE BIOGEL SZ 6.5 SURGICAL PF LTX (50PR/BX 4BX/CA)

## (undated) DEVICE — PROTECTOR ULNA NERVE - (36PR/CA)

## (undated) DEVICE — SUCTION INSTRUMENT YANKAUER BULBOUS TIP W/O VENT (50EA/CA)

## (undated) DEVICE — SUTURE 2-0 PDS II CT-2 - (36/BX)